# Patient Record
Sex: FEMALE | Race: OTHER | HISPANIC OR LATINO
[De-identification: names, ages, dates, MRNs, and addresses within clinical notes are randomized per-mention and may not be internally consistent; named-entity substitution may affect disease eponyms.]

---

## 2018-03-07 PROBLEM — Z00.00 ENCOUNTER FOR PREVENTIVE HEALTH EXAMINATION: Status: ACTIVE | Noted: 2018-03-07

## 2018-04-06 ENCOUNTER — APPOINTMENT (OUTPATIENT)
Dept: ENDOCRINOLOGY | Facility: CLINIC | Age: 68
End: 2018-04-06
Payer: MEDICAID

## 2018-04-06 VITALS
WEIGHT: 126 LBS | HEART RATE: 66 BPM | HEIGHT: 63 IN | DIASTOLIC BLOOD PRESSURE: 80 MMHG | OXYGEN SATURATION: 96 % | BODY MASS INDEX: 22.32 KG/M2 | RESPIRATION RATE: 16 BRPM | SYSTOLIC BLOOD PRESSURE: 120 MMHG

## 2018-04-06 DIAGNOSIS — I10 ESSENTIAL (PRIMARY) HYPERTENSION: ICD-10-CM

## 2018-04-06 DIAGNOSIS — Z78.9 OTHER SPECIFIED HEALTH STATUS: ICD-10-CM

## 2018-04-06 DIAGNOSIS — E04.1 NONTOXIC SINGLE THYROID NODULE: ICD-10-CM

## 2018-04-06 DIAGNOSIS — E05.90 THYROTOXICOSIS, UNSPECIFIED W/OUT THYROTOXIC CRISIS OR STORM: ICD-10-CM

## 2018-04-06 PROCEDURE — 99204 OFFICE O/P NEW MOD 45 MIN: CPT

## 2018-04-06 RX ORDER — ATORVASTATIN CALCIUM 40 MG/1
40 TABLET, FILM COATED ORAL
Qty: 30 | Refills: 0 | Status: ACTIVE | COMMUNITY
Start: 2017-07-24

## 2018-04-06 RX ORDER — LOSARTAN POTASSIUM AND HYDROCHLOROTHIAZIDE 12.5; 5 MG/1; MG/1
50-12.5 TABLET ORAL
Qty: 30 | Refills: 0 | Status: ACTIVE | COMMUNITY
Start: 2017-02-15

## 2018-04-06 RX ORDER — ATORVASTATIN CALCIUM 20 MG/1
20 TABLET, FILM COATED ORAL
Refills: 0 | Status: ACTIVE | COMMUNITY

## 2018-04-06 RX ORDER — GABAPENTIN 100 MG/1
100 CAPSULE ORAL
Qty: 30 | Refills: 0 | Status: ACTIVE | COMMUNITY
Start: 2017-08-30

## 2018-04-06 RX ORDER — BUSPIRONE HYDROCHLORIDE 5 MG/1
5 TABLET ORAL
Qty: 30 | Refills: 0 | Status: ACTIVE | COMMUNITY
Start: 2018-01-02

## 2018-04-06 RX ORDER — MELOXICAM 7.5 MG/1
7.5 TABLET ORAL
Qty: 30 | Refills: 0 | Status: ACTIVE | COMMUNITY
Start: 2018-03-13

## 2018-04-06 RX ORDER — LOSARTAN POTASSIUM 50 MG/1
50 TABLET, FILM COATED ORAL
Refills: 0 | Status: ACTIVE | COMMUNITY

## 2018-04-06 RX ORDER — ATORVASTATIN CALCIUM 20 MG/1
20 TABLET, FILM COATED ORAL
Qty: 30 | Refills: 0 | Status: ACTIVE | COMMUNITY
Start: 2018-02-17

## 2018-04-06 RX ORDER — BACLOFEN 10 MG/1
10 TABLET ORAL
Qty: 30 | Refills: 0 | Status: ACTIVE | COMMUNITY
Start: 2017-08-30

## 2023-03-28 ENCOUNTER — FORM ENCOUNTER (OUTPATIENT)
Age: 73
End: 2023-03-28

## 2023-04-04 ENCOUNTER — EMERGENCY (EMERGENCY)
Facility: HOSPITAL | Age: 73
LOS: 1 days | Discharge: HOME | End: 2023-04-04
Attending: EMERGENCY MEDICINE
Payer: MEDICARE

## 2023-04-04 VITALS
HEART RATE: 66 BPM | TEMPERATURE: 97 F | SYSTOLIC BLOOD PRESSURE: 158 MMHG | OXYGEN SATURATION: 99 % | RESPIRATION RATE: 18 BRPM | HEIGHT: 62 IN | DIASTOLIC BLOOD PRESSURE: 77 MMHG | WEIGHT: 132.28 LBS

## 2023-04-04 LAB
ALBUMIN SERPL ELPH-MCNC: 3.7 G/DL — SIGNIFICANT CHANGE UP (ref 3.5–5)
ALP SERPL-CCNC: 142 U/L — HIGH (ref 40–120)
ALT FLD-CCNC: 25 U/L DA — SIGNIFICANT CHANGE UP (ref 10–60)
ANION GAP SERPL CALC-SCNC: 1 MMOL/L — LOW (ref 5–17)
APPEARANCE UR: CLEAR — SIGNIFICANT CHANGE UP
AST SERPL-CCNC: 24 U/L — SIGNIFICANT CHANGE UP (ref 10–40)
BASOPHILS # BLD AUTO: 0.03 K/UL — SIGNIFICANT CHANGE UP (ref 0–0.2)
BASOPHILS NFR BLD AUTO: 0.4 % — SIGNIFICANT CHANGE UP (ref 0–2)
BILIRUB SERPL-MCNC: 0.2 MG/DL — SIGNIFICANT CHANGE UP (ref 0.2–1.2)
BILIRUB UR-MCNC: NEGATIVE — SIGNIFICANT CHANGE UP
BUN SERPL-MCNC: 30 MG/DL — HIGH (ref 7–18)
CALCIUM SERPL-MCNC: 9.1 MG/DL — SIGNIFICANT CHANGE UP (ref 8.4–10.5)
CHLORIDE SERPL-SCNC: 109 MMOL/L — HIGH (ref 96–108)
CO2 SERPL-SCNC: 28 MMOL/L — SIGNIFICANT CHANGE UP (ref 22–31)
COLOR SPEC: YELLOW — SIGNIFICANT CHANGE UP
CREAT SERPL-MCNC: 1.27 MG/DL — SIGNIFICANT CHANGE UP (ref 0.5–1.3)
D DIMER BLD IA.RAPID-MCNC: 315 NG/ML DDU — HIGH
DIFF PNL FLD: NEGATIVE — SIGNIFICANT CHANGE UP
EGFR: 45 ML/MIN/1.73M2 — LOW
EOSINOPHIL # BLD AUTO: 0.13 K/UL — SIGNIFICANT CHANGE UP (ref 0–0.5)
EOSINOPHIL NFR BLD AUTO: 1.8 % — SIGNIFICANT CHANGE UP (ref 0–6)
FLUAV AG NPH QL: SIGNIFICANT CHANGE UP
FLUBV AG NPH QL: SIGNIFICANT CHANGE UP
GLUCOSE SERPL-MCNC: 119 MG/DL — HIGH (ref 70–99)
GLUCOSE UR QL: NEGATIVE — SIGNIFICANT CHANGE UP
HCT VFR BLD CALC: 34.8 % — SIGNIFICANT CHANGE UP (ref 34.5–45)
HGB BLD-MCNC: 11.4 G/DL — LOW (ref 11.5–15.5)
IMM GRANULOCYTES NFR BLD AUTO: 0.3 % — SIGNIFICANT CHANGE UP (ref 0–0.9)
KETONES UR-MCNC: NEGATIVE — SIGNIFICANT CHANGE UP
LEUKOCYTE ESTERASE UR-ACNC: ABNORMAL
LYMPHOCYTES # BLD AUTO: 1.28 K/UL — SIGNIFICANT CHANGE UP (ref 1–3.3)
LYMPHOCYTES # BLD AUTO: 17.8 % — SIGNIFICANT CHANGE UP (ref 13–44)
MCHC RBC-ENTMCNC: 29 PG — SIGNIFICANT CHANGE UP (ref 27–34)
MCHC RBC-ENTMCNC: 32.8 GM/DL — SIGNIFICANT CHANGE UP (ref 32–36)
MCV RBC AUTO: 88.5 FL — SIGNIFICANT CHANGE UP (ref 80–100)
MONOCYTES # BLD AUTO: 0.41 K/UL — SIGNIFICANT CHANGE UP (ref 0–0.9)
MONOCYTES NFR BLD AUTO: 5.7 % — SIGNIFICANT CHANGE UP (ref 2–14)
NEUTROPHILS # BLD AUTO: 5.31 K/UL — SIGNIFICANT CHANGE UP (ref 1.8–7.4)
NEUTROPHILS NFR BLD AUTO: 74 % — SIGNIFICANT CHANGE UP (ref 43–77)
NITRITE UR-MCNC: NEGATIVE — SIGNIFICANT CHANGE UP
NRBC # BLD: 0 /100 WBCS — SIGNIFICANT CHANGE UP (ref 0–0)
NT-PROBNP SERPL-SCNC: 75 PG/ML — SIGNIFICANT CHANGE UP (ref 0–125)
PH UR: 7 — SIGNIFICANT CHANGE UP (ref 5–8)
PLATELET # BLD AUTO: 238 K/UL — SIGNIFICANT CHANGE UP (ref 150–400)
POTASSIUM SERPL-MCNC: 4.4 MMOL/L — SIGNIFICANT CHANGE UP (ref 3.5–5.3)
POTASSIUM SERPL-SCNC: 4.4 MMOL/L — SIGNIFICANT CHANGE UP (ref 3.5–5.3)
PROT SERPL-MCNC: 7 G/DL — SIGNIFICANT CHANGE UP (ref 6–8.3)
PROT UR-MCNC: NEGATIVE — SIGNIFICANT CHANGE UP
RBC # BLD: 3.93 M/UL — SIGNIFICANT CHANGE UP (ref 3.8–5.2)
RBC # FLD: 13.3 % — SIGNIFICANT CHANGE UP (ref 10.3–14.5)
SARS-COV-2 RNA SPEC QL NAA+PROBE: SIGNIFICANT CHANGE UP
SODIUM SERPL-SCNC: 138 MMOL/L — SIGNIFICANT CHANGE UP (ref 135–145)
SP GR SPEC: 1.01 — SIGNIFICANT CHANGE UP (ref 1.01–1.02)
TROPONIN I, HIGH SENSITIVITY RESULT: 6.6 NG/L — SIGNIFICANT CHANGE UP
UROBILINOGEN FLD QL: NEGATIVE — SIGNIFICANT CHANGE UP
WBC # BLD: 7.18 K/UL — SIGNIFICANT CHANGE UP (ref 3.8–10.5)
WBC # FLD AUTO: 7.18 K/UL — SIGNIFICANT CHANGE UP (ref 3.8–10.5)

## 2023-04-04 PROCEDURE — 72131 CT LUMBAR SPINE W/O DYE: CPT | Mod: 26,MA

## 2023-04-04 PROCEDURE — 99285 EMERGENCY DEPT VISIT HI MDM: CPT

## 2023-04-04 NOTE — ED PROVIDER NOTE - NSFOLLOWUPINSTRUCTIONS_ED_ALL_ED_FT
Take tylenol 650mg or ibuprofen 600mg every 6 hours for pain.  Followup with PMD for reevaluation.  Return to ED if you develop worsening symptoms-high fevers, vomiting or difficulty breathing.    [place] tylenol 650 mg o ibuprofeno 600 mg cada 6 horas para el dolor.  Seguimiento con PMD para reevaluación.  Regrese a la ronnie de urgencias si presenta síntomas que empeoran: fiebre jose, vómitos o dificultad para respirar.

## 2023-04-04 NOTE — ED PROVIDER NOTE - CLINICAL SUMMARY MEDICAL DECISION MAKING FREE TEXT BOX
73-year-old female with history of hypertension, hyperlipidemia, GERD, depression/anxiety, dementia presents with 2 days of shortness of breath and low back pain. Will obtain EKG labs chest x-ray CT L-spine.  Given Toradol for low back pain.  Will reassess. 73-year-old female with history of hypertension, hyperlipidemia, GERD, depression/anxiety, dementia presents with 2 days of shortness of breath and low back pain. Will obtain EKG labs chest x-ray CT L-spine.  Given Toradol for low back pain.  Will reassess.  ekg interpretation- no acute ischemic changes  lab interpretation- trop neg, ddimer 315  imaging interpretation- CXR shows no focal infiltrate  CTA chest neg for PE  CTLspine shows no fracture  Discussed above with patient/family. On reeval patient reports mild headache, given tylenol.  patient stable for discharge with careful return to ED precautions.

## 2023-04-04 NOTE — ED PROVIDER NOTE - NSCAREINITIATED _GEN_ER
Follow-up in 3 months  Follow diet as discussed  Get lab work done prior to next office visit  It is recommended to check with your insurance BEFORE getting labs done to make sure they are covered by your policy  Make sure to HOLD any multivitamins that may contain biotin and any biotin supplements FOR 5 DAYS before any labs since it can affect the results  Follow vitamin  and mineral recommendations as reviewed with you  Exercise as tolerated  Call our office if you have any problems with abdominal pain especially associated with fever, chills, nausea, vomiting or any other concerns  All  Post-bariatric surgery patients should be aware that very small quantities of any alcohol  can cause impairment and it is very possible not to feel the effect  The effect can be in the system for several hours  It is also a stomach irritant  It is advised to AVOID alcohol, Nonsteroidal antiinflammatory drugs (NSAIDS) and nicotine of all forms   Any of these can cause stomach irritation/pain 
Phu Mata(Attending)

## 2023-04-04 NOTE — ED PROVIDER NOTE - OBJECTIVE STATEMENT
73-year-old female with history of hypertension, hyperlipidemia, GERD, depression/anxiety, dementia presents with 2 days of shortness of breath and low back pain.  Denies any fevers cough or chest pain.  Reports low back pain its in the middle of her low back and yesterday was radiating to her right buttock area/posterior thigh.  Reports that today the buttock and thigh pain have resolved.  Reports taking Tylenol for pain which helped with her back pain.  Denies any past history of shortness of breath.  Denies any recent leg swelling or recent travel.

## 2023-04-04 NOTE — ED PROVIDER NOTE - MUSCULOSKELETAL MINIMAL EXAM
mild ttp over midline/bilateral paraspinal L3-L5 areas,  BLE- normal sensory function/ normal strength/atraumatic/normal range of motion

## 2023-04-04 NOTE — ED PROVIDER NOTE - PATIENT PORTAL LINK FT
You can access the FollowMyHealth Patient Portal offered by [facility] by registering at the following website: [email or web address]. By joining [facility]’s FollowMyHealth portal, you will also be able to view your health information using other applications (apps) compatible with our system.

## 2023-04-05 VITALS
DIASTOLIC BLOOD PRESSURE: 81 MMHG | RESPIRATION RATE: 18 BRPM | TEMPERATURE: 98 F | OXYGEN SATURATION: 98 % | SYSTOLIC BLOOD PRESSURE: 176 MMHG | HEART RATE: 73 BPM

## 2023-04-05 PROCEDURE — 85025 COMPLETE CBC W/AUTO DIFF WBC: CPT

## 2023-04-05 PROCEDURE — 36415 COLL VENOUS BLD VENIPUNCTURE: CPT

## 2023-04-05 PROCEDURE — 71275 CT ANGIOGRAPHY CHEST: CPT | Mod: MA

## 2023-04-05 PROCEDURE — 72131 CT LUMBAR SPINE W/O DYE: CPT | Mod: MA

## 2023-04-05 PROCEDURE — 81001 URINALYSIS AUTO W/SCOPE: CPT

## 2023-04-05 PROCEDURE — 87086 URINE CULTURE/COLONY COUNT: CPT

## 2023-04-05 PROCEDURE — 87637 SARSCOV2&INF A&B&RSV AMP PRB: CPT

## 2023-04-05 PROCEDURE — 71275 CT ANGIOGRAPHY CHEST: CPT | Mod: 26,MA

## 2023-04-05 PROCEDURE — 84484 ASSAY OF TROPONIN QUANT: CPT

## 2023-04-05 PROCEDURE — 80053 COMPREHEN METABOLIC PANEL: CPT

## 2023-04-05 PROCEDURE — 71045 X-RAY EXAM CHEST 1 VIEW: CPT

## 2023-04-05 PROCEDURE — 85379 FIBRIN DEGRADATION QUANT: CPT

## 2023-04-05 PROCEDURE — 93005 ELECTROCARDIOGRAM TRACING: CPT

## 2023-04-05 PROCEDURE — 83880 ASSAY OF NATRIURETIC PEPTIDE: CPT

## 2023-04-05 PROCEDURE — 71045 X-RAY EXAM CHEST 1 VIEW: CPT | Mod: 26

## 2023-04-05 PROCEDURE — 99285 EMERGENCY DEPT VISIT HI MDM: CPT | Mod: 25

## 2023-04-05 RX ORDER — ACETAMINOPHEN 500 MG
650 TABLET ORAL ONCE
Refills: 0 | Status: COMPLETED | OUTPATIENT
Start: 2023-04-05 | End: 2023-04-05

## 2023-04-05 RX ADMIN — Medication 650 MILLIGRAM(S): at 04:06

## 2023-04-05 RX ADMIN — Medication 650 MILLIGRAM(S): at 04:35

## 2023-04-06 LAB
CULTURE RESULTS: SIGNIFICANT CHANGE UP
SPECIMEN SOURCE: SIGNIFICANT CHANGE UP

## 2023-04-26 ENCOUNTER — APPOINTMENT (OUTPATIENT)
Dept: SURGICAL ONCOLOGY | Facility: CLINIC | Age: 73
End: 2023-04-26
Payer: MEDICARE

## 2023-04-26 VITALS
TEMPERATURE: 96 F | BODY MASS INDEX: 22.86 KG/M2 | OXYGEN SATURATION: 100 % | WEIGHT: 129 LBS | HEART RATE: 53 BPM | HEIGHT: 63 IN | DIASTOLIC BLOOD PRESSURE: 65 MMHG | SYSTOLIC BLOOD PRESSURE: 103 MMHG

## 2023-04-26 LAB
ALBUMIN SERPL ELPH-MCNC: 4.7 G/DL
ALP BLD-CCNC: 143 U/L
ALT SERPL-CCNC: 19 U/L
ANION GAP SERPL CALC-SCNC: 13 MMOL/L
AST SERPL-CCNC: 26 U/L
BASOPHILS # BLD AUTO: 0.03 K/UL
BASOPHILS NFR BLD AUTO: 0.5 %
BILIRUB SERPL-MCNC: 0.3 MG/DL
BUN SERPL-MCNC: 21 MG/DL
CALCIUM SERPL-MCNC: 10 MG/DL
CEA SERPL-MCNC: 2.5 NG/ML
CHLORIDE SERPL-SCNC: 101 MMOL/L
CO2 SERPL-SCNC: 27 MMOL/L
CREAT SERPL-MCNC: 0.94 MG/DL
EGFR: 65 ML/MIN/1.73M2
EOSINOPHIL # BLD AUTO: 0.12 K/UL
EOSINOPHIL NFR BLD AUTO: 2 %
GLUCOSE SERPL-MCNC: 84 MG/DL
HCT VFR BLD CALC: 38.4 %
HGB BLD-MCNC: 12.7 G/DL
IMM GRANULOCYTES NFR BLD AUTO: 0.5 %
LYMPHOCYTES # BLD AUTO: 1.13 K/UL
LYMPHOCYTES NFR BLD AUTO: 18.4 %
MAN DIFF?: NORMAL
MCHC RBC-ENTMCNC: 29.1 PG
MCHC RBC-ENTMCNC: 33.1 GM/DL
MCV RBC AUTO: 87.9 FL
MONOCYTES # BLD AUTO: 0.49 K/UL
MONOCYTES NFR BLD AUTO: 8 %
NEUTROPHILS # BLD AUTO: 4.33 K/UL
NEUTROPHILS NFR BLD AUTO: 70.6 %
PLATELET # BLD AUTO: 243 K/UL
POTASSIUM SERPL-SCNC: 4.5 MMOL/L
PROT SERPL-MCNC: 7.4 G/DL
RBC # BLD: 4.37 M/UL
RBC # FLD: 13.4 %
SODIUM SERPL-SCNC: 141 MMOL/L
WBC # FLD AUTO: 6.13 K/UL

## 2023-04-26 PROCEDURE — 99204 OFFICE O/P NEW MOD 45 MIN: CPT

## 2023-05-02 NOTE — ASSESSMENT
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 Both location tattooed. T1b\par \par \par \par PLAN: \par CT chest/abd/pelvis\par CBC, CMP, CEA now \par Medical optimization from Dr. Reyes \par RTO 1 week after imaging \par Will plan for robotic distal gastrectomy tentatively pending CT and labs.\par \par I have discussed the risks, benefits, alternatives, complications including but not limited bleeding, infection, damage to adjacent structures, sepsis, need for further procedures, sepsis, tumor recurrence to the patient in detail. Patient expressed verbal understanding. Written informed consent to be obtained in the preoperative period \par \par I have discussed the diagnosis, therapeutic plan and options with the patient at length. Patient expressed verbal understanding to proceed with proposed plan. All questions answered. \par  \par \par

## 2023-05-02 NOTE — HISTORY OF PRESENT ILLNESS
[de-identified] : Ms. GABY BOYKIN is a 73 year old female who present today for initial consultation gastric cancer. Referred Dr. 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 \par EUS 4/26/23: 1 cm raised mid body anterior wall of stomach and 1.2 cm gastric polypoid lesion in the posterior wall. Both location tattooed. T1 \par \par Gaby denies abdominal pain, bloating, nausea/vomiting, bowel habit changes, hematochezia, black sticky stools, fever, chills, night sweats or weight loss. \par \par PCP: Dr. Reyes \par

## 2023-05-02 NOTE — PHYSICAL EXAM
[Normal] : supple, no neck mass and thyroid not enlarged [Normal Neck Lymph Nodes] : normal neck lymph nodes  [Normal Supraclavicular Lymph Nodes] : normal supraclavicular lymph nodes [Normal Groin Lymph Nodes] : normal groin lymph nodes [Normal Axillary Lymph Nodes] : normal axillary lymph nodes [Normal] : oriented to person, place and time, with appropriate affect [FreeTextEntry1] : COVID-19 precautions as per [facility] policy was universally followed\par  [de-identified] : Abdomen soft, non-tender, non-distended, and no palpable masses.

## 2023-05-02 NOTE — CONSULT LETTER
[Consult Letter:] : I had the pleasure of evaluating your patient, [unfilled]. [( Thank you for referring [unfilled] for consultation for _____ )] : Thank you for referring [unfilled] for consultation for [unfilled] [Please see my note below.] : Please see my note below. [Consult Closing:] : Thank you very much for allowing me to participate in the care of this patient.  If you have any questions, please do not hesitate to contact me. [Sincerely,] : Sincerely, [FreeTextEntry2] : Dr. Palomino  [FreeTextEntry3] : Oswaldo Matthews MD, FICS, FACS\par Director of Surgical Oncology- [facility] \par , Department of Surgery  \par [facility] [employer] [facility] [employer] [facility] \par [address]\[place] [address]\par \par [address]\[place] [address]\par \par [address]\[place] [address]\par \par (mob) [phone]\par (o) [phone]\par (f) [phone]\par

## 2023-05-03 ENCOUNTER — APPOINTMENT (OUTPATIENT)
Dept: SURGICAL ONCOLOGY | Facility: CLINIC | Age: 73
End: 2023-05-03

## 2023-05-04 ENCOUNTER — RESULT REVIEW (OUTPATIENT)
Age: 73
End: 2023-05-04

## 2023-05-08 ENCOUNTER — FORM ENCOUNTER (OUTPATIENT)
Age: 73
End: 2023-05-08

## 2023-05-09 ENCOUNTER — APPOINTMENT (OUTPATIENT)
Dept: CT IMAGING | Facility: IMAGING CENTER | Age: 73
End: 2023-05-09
Payer: MEDICARE

## 2023-05-09 ENCOUNTER — OUTPATIENT (OUTPATIENT)
Dept: OUTPATIENT SERVICES | Facility: HOSPITAL | Age: 73
LOS: 1 days | End: 2023-05-09
Payer: MEDICARE

## 2023-05-09 DIAGNOSIS — C16.9 MALIGNANT NEOPLASM OF STOMACH, UNSPECIFIED: ICD-10-CM

## 2023-05-09 PROCEDURE — 74177 CT ABD & PELVIS W/CONTRAST: CPT | Mod: 26

## 2023-05-09 PROCEDURE — 74177 CT ABD & PELVIS W/CONTRAST: CPT

## 2023-05-10 ENCOUNTER — OUTPATIENT (OUTPATIENT)
Dept: OUTPATIENT SERVICES | Facility: HOSPITAL | Age: 73
LOS: 1 days | End: 2023-05-10
Payer: MEDICARE

## 2023-05-10 DIAGNOSIS — K31.7 POLYP OF STOMACH AND DUODENUM: ICD-10-CM

## 2023-05-10 DIAGNOSIS — C16.9 MALIGNANT NEOPLASM OF STOMACH, UNSPECIFIED: ICD-10-CM

## 2023-05-10 DIAGNOSIS — K31.89 OTHER DISEASES OF STOMACH AND DUODENUM: ICD-10-CM

## 2023-05-10 DIAGNOSIS — R10.13 EPIGASTRIC PAIN: ICD-10-CM

## 2023-05-10 DIAGNOSIS — K21.9 GASTRO-ESOPHAGEAL REFLUX DISEASE WITHOUT ESOPHAGITIS: ICD-10-CM

## 2023-05-11 LAB — SURGICAL PATHOLOGY STUDY: SIGNIFICANT CHANGE UP

## 2023-05-11 PROCEDURE — 88321 CONSLTJ&REPRT SLD PREP ELSWR: CPT

## 2023-05-12 ENCOUNTER — OUTPATIENT (OUTPATIENT)
Dept: OUTPATIENT SERVICES | Facility: HOSPITAL | Age: 73
LOS: 1 days | End: 2023-05-12

## 2023-05-12 VITALS
HEIGHT: 63 IN | OXYGEN SATURATION: 99 % | TEMPERATURE: 98 F | WEIGHT: 128.97 LBS | HEART RATE: 64 BPM | DIASTOLIC BLOOD PRESSURE: 76 MMHG | SYSTOLIC BLOOD PRESSURE: 157 MMHG | RESPIRATION RATE: 18 BRPM

## 2023-05-12 DIAGNOSIS — C16.9 MALIGNANT NEOPLASM OF STOMACH, UNSPECIFIED: ICD-10-CM

## 2023-05-12 DIAGNOSIS — F41.9 ANXIETY DISORDER, UNSPECIFIED: ICD-10-CM

## 2023-05-12 DIAGNOSIS — E78.5 HYPERLIPIDEMIA, UNSPECIFIED: ICD-10-CM

## 2023-05-12 DIAGNOSIS — Z01.818 ENCOUNTER FOR OTHER PREPROCEDURAL EXAMINATION: ICD-10-CM

## 2023-05-12 DIAGNOSIS — F03.90 UNSPECIFIED DEMENTIA, UNSPECIFIED SEVERITY, WITHOUT BEHAVIORAL DISTURBANCE, PSYCHOTIC DISTURBANCE, MOOD DISTURBANCE, AND ANXIETY: ICD-10-CM

## 2023-05-12 DIAGNOSIS — I10 ESSENTIAL (PRIMARY) HYPERTENSION: ICD-10-CM

## 2023-05-12 LAB — BLD GP AB SCN SERPL QL: SIGNIFICANT CHANGE UP

## 2023-05-12 RX ORDER — TELMISARTAN 20 MG/1
1 TABLET ORAL
Refills: 0 | DISCHARGE

## 2023-05-12 RX ORDER — MEMANTINE HYDROCHLORIDE 10 MG/1
1 TABLET ORAL
Refills: 0 | DISCHARGE

## 2023-05-12 RX ORDER — SERTRALINE 25 MG/1
1 TABLET, FILM COATED ORAL
Refills: 0 | DISCHARGE

## 2023-05-12 RX ORDER — ATORVASTATIN CALCIUM 80 MG/1
1 TABLET, FILM COATED ORAL
Refills: 0 | DISCHARGE

## 2023-05-12 RX ORDER — PANTOPRAZOLE SODIUM 20 MG/1
1 TABLET, DELAYED RELEASE ORAL
Refills: 0 | DISCHARGE

## 2023-05-12 RX ORDER — CLONAZEPAM 1 MG
1 TABLET ORAL
Refills: 0 | DISCHARGE

## 2023-05-12 RX ORDER — SUCRALFATE 1 G
1 TABLET ORAL
Refills: 0 | DISCHARGE

## 2023-05-12 NOTE — H&P PST ADULT - BIRTH SEX
Rest, drink plenty of fluids.  Clear liquid diet for 24 hours and advance as tolerated to bland diet until symptoms improve.  Take your meds as prescribed.  You may also take over-the-counter acetaminophen as needed for pain or fever.  I would consult my OB/GYN by telephone tomorrow to discuss your Zoloft and your Risperdal medications to see if they want you to continue those with your pregnancy center appointments not until August.  Follow-up with them as scheduled.  Follow-up with your primary healthcare provider as needed.  Return to the emergency department for any acutely developing abdominal/pelvic pain, any persistent vaginal bleeding greater than 1 saturated pad per hour, any persistent vomiting or any new or worse concerns.   Female

## 2023-05-12 NOTE — H&P PST ADULT - PROBLEM SELECTOR PLAN 1
Pt is scheduled for robotic assisted distal gastrectomy with esophagogastroduodenoscopy possible open on 5/18/2023.  DARRIN stop bang score 3. Pt denies history of DARRIN, never did sleep study.  As per pt's sister, pt will be optimized by PCP and cardiology.  Preoperative instructions discussed with pt's sister Lorin Jaiml via Indonesian speaking [employer]  florin Rivasjandra ID# [id number]. Czech Copy given to pt.   Instructed pt's sister that she will need someone to escort her home after surgery, to notify security when she arrives in the lobby of the hospital that she is here for surgery, not to eat or drink anything after midnight the night before the surgery, to avoid NSAIDs such as Ibuprofen, Motrin, Aleve, Advil, Naproxen before surgery, to take Tylenol if needed for pain, to report if she has been exposed to any one with any contagious diseases including Covid-19 or if she is exhibiting any symptoms of COVID-19.   Instructed pt's sister about use of Chlorhexidine 4% soap for 3 days before surgery including the morning of the surgery to prevent infection. Verbalized understanding of instructions given. Pt is scheduled for robotic assisted distal gastrectomy with esophagogastroduodenoscopy possible open on 5/18/2023.  DARRIN stop bang score 3. Pt denies history of DARRIN, never did sleep study.  As per pt's sister, pt will be optimized by PCP.  Preoperative instructions discussed with pt's sister Lorin Jamil via [employer] speaking [employer]  florin Rivasjandra ID# [id number]. Libyan Copy given to pt.   Instructed pt's sister that she will need someone to escort her home after surgery, to notify security when she arrives in the lobby of the hospital that she is here for surgery, not to eat or drink anything after midnight the night before the surgery, to avoid NSAIDs such as Ibuprofen, Motrin, Aleve, Advil, Naproxen before surgery, to take Tylenol if needed for pain, to report if she has been exposed to any one with any contagious diseases including Covid-19 or if she is exhibiting any symptoms of COVID-19.   Instructed pt's sister about use of Chlorhexidine 4% soap for 3 days before surgery including the morning of the surgery to prevent infection. Verbalized understanding of instructions given.

## 2023-05-12 NOTE — H&P PST ADULT - NSICDXPASTMEDICALHX_GEN_ALL_CORE_FT
PAST MEDICAL HISTORY:  Hypertension     Malignant neoplasm of stomach      PAST MEDICAL HISTORY:  Anxiety and depression     Dementia     HLD (hyperlipidemia)     Hypertension     Malignant neoplasm of stomach

## 2023-05-12 NOTE — H&P PST ADULT - HISTORY OF PRESENT ILLNESS
This is a 73 year old  female with PMH of HTN, HLD, anxiety and depression, dementia for years who presents with c/o gastric cancer. As per pt's sister, pt underwent EGD that revealed gastric polyps. Pathology revealed cancerous cells. Pt is scheduled for robotic assisted distal gastrectomy with esophagoduodenoscopy possible open on 5/18/2023.

## 2023-05-12 NOTE — H&P PST ADULT - PROBLEM SELECTOR PLAN 2
Instructed pt's sister to continue antihypertensive med-Telmisartan and take it with sips of water on day of surgery.    Follow-up with PCP postop for management.

## 2023-05-12 NOTE — H&P PST ADULT - PROBLEM SELECTOR PLAN 5
Instructed to continue memantine and take it with sips of water on day of surgery.  As per pt's sister, she will sign consent for surgery.  Follow-up with provider for management.

## 2023-05-12 NOTE — H&P PST ADULT - NSICDXPROCEDURE_GEN_ALL_CORE_FT
PROCEDURES:  Distal partial gastrectomy 12-May-2023 14:54:45  Comfort Meraz  EGD 12-May-2023 14:55:03  Comfort Meraz

## 2023-05-12 NOTE — H&P PST ADULT - ASSESSMENT
This is a 73 year old  female with PMH of HTN, HLD, anxiety and depression, dementia for years who presents with gastric cancer. Pt is scheduled for robotic assisted distal gastrectomy with esophagogastroduodenoscopy possible open on 5/18/2023.  DARRIN stop bang score 3. Pt denies history of DARRIN, never did sleep study.

## 2023-05-12 NOTE — H&P PST ADULT - PROBLEM SELECTOR PLAN 4
Instructed pt to continue meds, to take them with sips of water on day of surgery and to  follow-up with PCP/provider postop for management

## 2023-05-13 LAB
A1C WITH ESTIMATED AVERAGE GLUCOSE RESULT: 5.7 % — HIGH (ref 4–5.6)
ESTIMATED AVERAGE GLUCOSE: 117 MG/DL — HIGH (ref 68–114)

## 2023-05-15 ENCOUNTER — FORM ENCOUNTER (OUTPATIENT)
Age: 73
End: 2023-05-15

## 2023-05-16 ENCOUNTER — APPOINTMENT (OUTPATIENT)
Dept: NUCLEAR MEDICINE | Facility: IMAGING CENTER | Age: 73
End: 2023-05-16
Payer: MEDICARE

## 2023-05-16 ENCOUNTER — APPOINTMENT (OUTPATIENT)
Dept: NUCLEAR MEDICINE | Facility: IMAGING CENTER | Age: 73
End: 2023-05-16

## 2023-05-16 ENCOUNTER — OUTPATIENT (OUTPATIENT)
Dept: OUTPATIENT SERVICES | Facility: HOSPITAL | Age: 73
LOS: 1 days | End: 2023-05-16
Payer: MEDICARE

## 2023-05-16 DIAGNOSIS — C16.9 MALIGNANT NEOPLASM OF STOMACH, UNSPECIFIED: ICD-10-CM

## 2023-05-16 PROBLEM — F03.90 UNSPECIFIED DEMENTIA, UNSPECIFIED SEVERITY, WITHOUT BEHAVIORAL DISTURBANCE, PSYCHOTIC DISTURBANCE, MOOD DISTURBANCE, AND ANXIETY: Chronic | Status: ACTIVE | Noted: 2023-05-12

## 2023-05-16 PROBLEM — F41.9 ANXIETY DISORDER, UNSPECIFIED: Chronic | Status: ACTIVE | Noted: 2023-05-12

## 2023-05-16 PROBLEM — E78.5 HYPERLIPIDEMIA, UNSPECIFIED: Chronic | Status: ACTIVE | Noted: 2023-05-12

## 2023-05-16 PROCEDURE — 78815 PET IMAGE W/CT SKULL-THIGH: CPT

## 2023-05-16 PROCEDURE — 78815 PET IMAGE W/CT SKULL-THIGH: CPT | Mod: 26,PI

## 2023-05-16 PROCEDURE — A9552: CPT

## 2023-05-17 ENCOUNTER — TRANSCRIPTION ENCOUNTER (OUTPATIENT)
Age: 73
End: 2023-05-17

## 2023-05-17 ENCOUNTER — FORM ENCOUNTER (OUTPATIENT)
Age: 73
End: 2023-05-17

## 2023-05-18 ENCOUNTER — APPOINTMENT (OUTPATIENT)
Dept: SURGICAL ONCOLOGY | Facility: HOSPITAL | Age: 73
End: 2023-05-18

## 2023-05-18 ENCOUNTER — INPATIENT (INPATIENT)
Facility: HOSPITAL | Age: 73
LOS: 3 days | Discharge: HOME HEALTH | DRG: 327 | End: 2023-05-22
Attending: SURGERY | Admitting: SURGERY
Payer: MEDICARE

## 2023-05-18 VITALS
SYSTOLIC BLOOD PRESSURE: 147 MMHG | HEART RATE: 54 BPM | HEIGHT: 63 IN | RESPIRATION RATE: 16 BRPM | DIASTOLIC BLOOD PRESSURE: 73 MMHG | WEIGHT: 128.97 LBS | OXYGEN SATURATION: 100 % | TEMPERATURE: 98 F

## 2023-05-18 DIAGNOSIS — C16.9 MALIGNANT NEOPLASM OF STOMACH, UNSPECIFIED: ICD-10-CM

## 2023-05-18 LAB
ALBUMIN SERPL ELPH-MCNC: 3.3 G/DL — LOW (ref 3.5–5)
ALP SERPL-CCNC: 110 U/L — SIGNIFICANT CHANGE UP (ref 40–120)
ALT FLD-CCNC: 230 U/L DA — HIGH (ref 10–60)
ANION GAP SERPL CALC-SCNC: 4 MMOL/L — LOW (ref 5–17)
AST SERPL-CCNC: 243 U/L — HIGH (ref 10–40)
BILIRUB SERPL-MCNC: 0.5 MG/DL — SIGNIFICANT CHANGE UP (ref 0.2–1.2)
BLD GP AB SCN SERPL QL: SIGNIFICANT CHANGE UP
BUN SERPL-MCNC: 21 MG/DL — HIGH (ref 7–18)
CALCIUM SERPL-MCNC: 8.8 MG/DL — SIGNIFICANT CHANGE UP (ref 8.4–10.5)
CHLORIDE SERPL-SCNC: 111 MMOL/L — HIGH (ref 96–108)
CO2 SERPL-SCNC: 25 MMOL/L — SIGNIFICANT CHANGE UP (ref 22–31)
CREAT SERPL-MCNC: 1.28 MG/DL — SIGNIFICANT CHANGE UP (ref 0.5–1.3)
EGFR: 45 ML/MIN/1.73M2 — LOW
GLUCOSE SERPL-MCNC: 125 MG/DL — HIGH (ref 70–99)
HCT VFR BLD CALC: 36.1 % — SIGNIFICANT CHANGE UP (ref 34.5–45)
HGB BLD-MCNC: 11.9 G/DL — SIGNIFICANT CHANGE UP (ref 11.5–15.5)
MAGNESIUM SERPL-MCNC: 2 MG/DL — SIGNIFICANT CHANGE UP (ref 1.6–2.6)
MCHC RBC-ENTMCNC: 28.8 PG — SIGNIFICANT CHANGE UP (ref 27–34)
MCHC RBC-ENTMCNC: 33 GM/DL — SIGNIFICANT CHANGE UP (ref 32–36)
MCV RBC AUTO: 87.4 FL — SIGNIFICANT CHANGE UP (ref 80–100)
NRBC # BLD: 0 /100 WBCS — SIGNIFICANT CHANGE UP (ref 0–0)
PHOSPHATE SERPL-MCNC: 4.5 MG/DL — SIGNIFICANT CHANGE UP (ref 2.5–4.5)
PLATELET # BLD AUTO: 201 K/UL — SIGNIFICANT CHANGE UP (ref 150–400)
POTASSIUM SERPL-MCNC: 4.1 MMOL/L — SIGNIFICANT CHANGE UP (ref 3.5–5.3)
POTASSIUM SERPL-SCNC: 4.1 MMOL/L — SIGNIFICANT CHANGE UP (ref 3.5–5.3)
PROT SERPL-MCNC: 6.4 G/DL — SIGNIFICANT CHANGE UP (ref 6–8.3)
RBC # BLD: 4.13 M/UL — SIGNIFICANT CHANGE UP (ref 3.8–5.2)
RBC # FLD: 13.1 % — SIGNIFICANT CHANGE UP (ref 10.3–14.5)
SODIUM SERPL-SCNC: 140 MMOL/L — SIGNIFICANT CHANGE UP (ref 135–145)
WBC # BLD: 11.69 K/UL — HIGH (ref 3.8–10.5)
WBC # FLD AUTO: 11.69 K/UL — HIGH (ref 3.8–10.5)

## 2023-05-18 PROCEDURE — 38570 LAPAROSCOPY LYMPH NODE BIOP: CPT

## 2023-05-18 PROCEDURE — 88108 CYTOPATH CONCENTRATE TECH: CPT | Mod: 26,59

## 2023-05-18 PROCEDURE — 88331 PATH CONSLTJ SURG 1 BLK 1SPC: CPT | Mod: 26

## 2023-05-18 PROCEDURE — 88307 TISSUE EXAM BY PATHOLOGIST: CPT | Mod: 26

## 2023-05-18 PROCEDURE — 88305 TISSUE EXAM BY PATHOLOGIST: CPT | Mod: 26

## 2023-05-18 PROCEDURE — 49905 OMENTAL FLAP INTRA-ABDOM: CPT

## 2023-05-18 PROCEDURE — 49203: CPT

## 2023-05-18 PROCEDURE — 88309 TISSUE EXAM BY PATHOLOGIST: CPT | Mod: 26

## 2023-05-18 PROCEDURE — 88189 FLOWCYTOMETRY/READ 16 & >: CPT

## 2023-05-18 PROCEDURE — 43236 UPPR GI SCOPE W/SUBMUC INJ: CPT

## 2023-05-18 PROCEDURE — 88334 PATH CONSLTJ SURG CYTO XM EA: CPT | Mod: 26,59

## 2023-05-18 PROCEDURE — S2900 ROBOTIC SURGICAL SYSTEM: CPT | Mod: NC

## 2023-05-18 PROCEDURE — 88341 IMHCHEM/IMCYTCHM EA ADD ANTB: CPT | Mod: 26

## 2023-05-18 PROCEDURE — 88342 IMHCHEM/IMCYTCHM 1ST ANTB: CPT | Mod: 26,59

## 2023-05-18 DEVICE — SURGICEL 4 X 8": Type: IMPLANTABLE DEVICE | Status: FUNCTIONAL

## 2023-05-18 DEVICE — LIGATING CLIPS WECK HEMOLOK POLYMER LARGE (PURPLE) 6: Type: IMPLANTABLE DEVICE | Status: FUNCTIONAL

## 2023-05-18 DEVICE — STAPLER COVIDIEN TRI-STAPLE 60MM PURPLE INTELLIGENT RELOAD: Type: IMPLANTABLE DEVICE | Status: FUNCTIONAL

## 2023-05-18 DEVICE — STAPLER COVIDIEN TRI-STAPLE 60MM PURPLE RELOAD: Type: IMPLANTABLE DEVICE | Status: FUNCTIONAL

## 2023-05-18 DEVICE — VISTASEAL FIBRIN HUMAN 10ML: Type: IMPLANTABLE DEVICE | Status: FUNCTIONAL

## 2023-05-18 RX ORDER — CEFOTETAN DISODIUM 1 G
1 VIAL (EA) INJECTION EVERY 12 HOURS
Refills: 0 | Status: COMPLETED | OUTPATIENT
Start: 2023-05-18 | End: 2023-05-19

## 2023-05-18 RX ORDER — SODIUM CHLORIDE 9 MG/ML
1000 INJECTION, SOLUTION INTRAVENOUS
Refills: 0 | Status: DISCONTINUED | OUTPATIENT
Start: 2023-05-18 | End: 2023-05-18

## 2023-05-18 RX ORDER — ONDANSETRON 8 MG/1
4 TABLET, FILM COATED ORAL ONCE
Refills: 0 | Status: DISCONTINUED | OUTPATIENT
Start: 2023-05-18 | End: 2023-05-18

## 2023-05-18 RX ORDER — ACETAMINOPHEN 500 MG
1000 TABLET ORAL ONCE
Refills: 0 | Status: COMPLETED | OUTPATIENT
Start: 2023-05-18 | End: 2023-05-19

## 2023-05-18 RX ORDER — HYDROMORPHONE HYDROCHLORIDE 2 MG/ML
0.5 INJECTION INTRAMUSCULAR; INTRAVENOUS; SUBCUTANEOUS
Refills: 0 | Status: DISCONTINUED | OUTPATIENT
Start: 2023-05-18 | End: 2023-05-20

## 2023-05-18 RX ORDER — SODIUM CHLORIDE 9 MG/ML
1000 INJECTION, SOLUTION INTRAVENOUS
Refills: 0 | Status: DISCONTINUED | OUTPATIENT
Start: 2023-05-18 | End: 2023-05-21

## 2023-05-18 RX ORDER — FENTANYL CITRATE 50 UG/ML
50 INJECTION INTRAVENOUS
Refills: 0 | Status: DISCONTINUED | OUTPATIENT
Start: 2023-05-18 | End: 2023-05-18

## 2023-05-18 RX ORDER — ONDANSETRON 8 MG/1
4 TABLET, FILM COATED ORAL EVERY 6 HOURS
Refills: 0 | Status: DISCONTINUED | OUTPATIENT
Start: 2023-05-18 | End: 2023-05-22

## 2023-05-18 RX ORDER — ACETAMINOPHEN 500 MG
1000 TABLET ORAL ONCE
Refills: 0 | Status: COMPLETED | OUTPATIENT
Start: 2023-05-19 | End: 2023-05-19

## 2023-05-18 RX ORDER — ENOXAPARIN SODIUM 100 MG/ML
40 INJECTION SUBCUTANEOUS EVERY 24 HOURS
Refills: 0 | Status: DISCONTINUED | OUTPATIENT
Start: 2023-05-19 | End: 2023-05-22

## 2023-05-18 RX ORDER — SODIUM CHLORIDE 9 MG/ML
3 INJECTION INTRAMUSCULAR; INTRAVENOUS; SUBCUTANEOUS EVERY 8 HOURS
Refills: 0 | Status: DISCONTINUED | OUTPATIENT
Start: 2023-05-18 | End: 2023-05-18

## 2023-05-18 RX ORDER — ACETAMINOPHEN 500 MG
1000 TABLET ORAL ONCE
Refills: 0 | Status: DISCONTINUED | OUTPATIENT
Start: 2023-05-18 | End: 2023-05-18

## 2023-05-18 RX ORDER — DIPHENHYDRAMINE HCL 50 MG
25 CAPSULE ORAL ONCE
Refills: 0 | Status: COMPLETED | OUTPATIENT
Start: 2023-05-18 | End: 2023-05-18

## 2023-05-18 RX ORDER — FENTANYL CITRATE 50 UG/ML
25 INJECTION INTRAVENOUS
Refills: 0 | Status: DISCONTINUED | OUTPATIENT
Start: 2023-05-18 | End: 2023-05-18

## 2023-05-18 RX ORDER — PANTOPRAZOLE SODIUM 20 MG/1
40 TABLET, DELAYED RELEASE ORAL DAILY
Refills: 0 | Status: DISCONTINUED | OUTPATIENT
Start: 2023-05-18 | End: 2023-05-21

## 2023-05-18 RX ORDER — ACETAMINOPHEN 500 MG
1000 TABLET ORAL ONCE
Refills: 0 | Status: COMPLETED | OUTPATIENT
Start: 2023-05-18 | End: 2023-05-18

## 2023-05-18 RX ORDER — CHLORHEXIDINE GLUCONATE 213 G/1000ML
1 SOLUTION TOPICAL ONCE
Refills: 0 | Status: COMPLETED | OUTPATIENT
Start: 2023-05-18 | End: 2023-05-18

## 2023-05-18 RX ADMIN — Medication 1000 MILLIGRAM(S): at 21:44

## 2023-05-18 RX ADMIN — Medication 25 MILLIGRAM(S): at 21:24

## 2023-05-18 RX ADMIN — Medication 1.25 MILLIGRAM(S): at 23:58

## 2023-05-18 RX ADMIN — Medication 1.25 MILLIGRAM(S): at 17:45

## 2023-05-18 RX ADMIN — SODIUM CHLORIDE 100 MILLILITER(S): 9 INJECTION, SOLUTION INTRAVENOUS at 21:24

## 2023-05-18 RX ADMIN — Medication 400 MILLIGRAM(S): at 21:24

## 2023-05-18 RX ADMIN — CHLORHEXIDINE GLUCONATE 1 APPLICATION(S): 213 SOLUTION TOPICAL at 07:16

## 2023-05-18 RX ADMIN — Medication 100 GRAM(S): at 17:45

## 2023-05-18 NOTE — CHART NOTE - NSCHARTNOTEFT_GEN_A_CORE
Pt POD 0 s/p distal partial gastrectomy, EGD  resting comfortably  no n/v    bang clear 600cc    Vital Signs Last 24 Hrs  T(C): 37.6 (18 May 2023 21:54), Max: 37.6 (18 May 2023 21:54)  T(F): 99.6 (18 May 2023 21:54), Max: 99.6 (18 May 2023 21:54)  HR: 80 (18 May 2023 21:54) (54 - 80)  BP: 124/47 (18 May 2023 21:54) (124/47 - 147/73)  BP(mean): 67 (18 May 2023 21:54) (67 - 90)  RR: 18 (18 May 2023 21:54) (12 - 18)  SpO2: 100% (18 May 2023 21:54) (97% - 100%)    Parameters below as of 18 May 2023 21:54  Patient On (Oxygen Delivery Method): room air    abd soft, inc/dsg CDI    stable post-op

## 2023-05-18 NOTE — PATIENT PROFILE ADULT - FALL HARM RISK - RISK INTERVENTIONS
Assistance OOB with selected safe patient handling equipment/Assistance with ambulation/Communicate Fall Risk and Risk Factors to all staff, patient, and family/Monitor gait and stability/Reinforce activity limits and safety measures with patient and family/Review medications for side effects contributing to fall risk/Sit up slowly, dangle for a short time, stand at bedside before walking/Toileting schedule using arm’s reach rule for commode and bathroom/Visual Cue: Yellow wristband/Bed in lowest position, wheels locked, appropriate side rails in place/Call bell, personal items and telephone in reach/Instruct patient to call for assistance before getting out of bed or chair/Non-slip footwear when patient is out of bed/[place] to call system/Physically safe environment - no spills, clutter or unnecessary equipment/Purposeful Proactive Rounding/Room/bathroom lighting operational, light cord in reach

## 2023-05-18 NOTE — BRIEF OPERATIVE NOTE - OPERATION/FINDINGS
EGD, distal gastrectomy with bilroth 2 reconstruction, biopsy of lymph node from jejunal mesentery sent for frozen

## 2023-05-19 LAB
ALBUMIN SERPL ELPH-MCNC: 2.8 G/DL — LOW (ref 3.5–5)
ALP SERPL-CCNC: 97 U/L — SIGNIFICANT CHANGE UP (ref 40–120)
ALT FLD-CCNC: 158 U/L DA — HIGH (ref 10–60)
ANION GAP SERPL CALC-SCNC: 3 MMOL/L — LOW (ref 5–17)
AST SERPL-CCNC: 136 U/L — HIGH (ref 10–40)
BILIRUB SERPL-MCNC: 0.6 MG/DL — SIGNIFICANT CHANGE UP (ref 0.2–1.2)
BUN SERPL-MCNC: 18 MG/DL — SIGNIFICANT CHANGE UP (ref 7–18)
CALCIUM SERPL-MCNC: 8.7 MG/DL — SIGNIFICANT CHANGE UP (ref 8.4–10.5)
CHLORIDE SERPL-SCNC: 110 MMOL/L — HIGH (ref 96–108)
CO2 SERPL-SCNC: 28 MMOL/L — SIGNIFICANT CHANGE UP (ref 22–31)
CREAT SERPL-MCNC: 1.09 MG/DL — SIGNIFICANT CHANGE UP (ref 0.5–1.3)
EGFR: 54 ML/MIN/1.73M2 — LOW
GLUCOSE SERPL-MCNC: 92 MG/DL — SIGNIFICANT CHANGE UP (ref 70–99)
HCT VFR BLD CALC: 31.4 % — LOW (ref 34.5–45)
HGB BLD-MCNC: 10.5 G/DL — LOW (ref 11.5–15.5)
MAGNESIUM SERPL-MCNC: 2.1 MG/DL — SIGNIFICANT CHANGE UP (ref 1.6–2.6)
MCHC RBC-ENTMCNC: 28.4 PG — SIGNIFICANT CHANGE UP (ref 27–34)
MCHC RBC-ENTMCNC: 33.4 GM/DL — SIGNIFICANT CHANGE UP (ref 32–36)
MCV RBC AUTO: 84.9 FL — SIGNIFICANT CHANGE UP (ref 80–100)
NRBC # BLD: 0 /100 WBCS — SIGNIFICANT CHANGE UP (ref 0–0)
PHOSPHATE SERPL-MCNC: 3.1 MG/DL — SIGNIFICANT CHANGE UP (ref 2.5–4.5)
PLATELET # BLD AUTO: 179 K/UL — SIGNIFICANT CHANGE UP (ref 150–400)
POTASSIUM SERPL-MCNC: 3.8 MMOL/L — SIGNIFICANT CHANGE UP (ref 3.5–5.3)
POTASSIUM SERPL-SCNC: 3.8 MMOL/L — SIGNIFICANT CHANGE UP (ref 3.5–5.3)
PROT SERPL-MCNC: 5.8 G/DL — LOW (ref 6–8.3)
RBC # BLD: 3.7 M/UL — LOW (ref 3.8–5.2)
RBC # FLD: 13.2 % — SIGNIFICANT CHANGE UP (ref 10.3–14.5)
SODIUM SERPL-SCNC: 141 MMOL/L — SIGNIFICANT CHANGE UP (ref 135–145)
WBC # BLD: 9.51 K/UL — SIGNIFICANT CHANGE UP (ref 3.8–10.5)
WBC # FLD AUTO: 9.51 K/UL — SIGNIFICANT CHANGE UP (ref 3.8–10.5)

## 2023-05-19 PROCEDURE — 74240 X-RAY XM UPR GI TRC 1CNTRST: CPT | Mod: 26

## 2023-05-19 RX ORDER — ACETAMINOPHEN 500 MG
1000 TABLET ORAL ONCE
Refills: 0 | Status: COMPLETED | OUTPATIENT
Start: 2023-05-19 | End: 2023-05-19

## 2023-05-19 RX ORDER — ACETAMINOPHEN 500 MG
1000 TABLET ORAL ONCE
Refills: 0 | Status: COMPLETED | OUTPATIENT
Start: 2023-05-20 | End: 2023-05-20

## 2023-05-19 RX ADMIN — Medication 1000 MILLIGRAM(S): at 06:34

## 2023-05-19 RX ADMIN — HYDROMORPHONE HYDROCHLORIDE 0.5 MILLIGRAM(S): 2 INJECTION INTRAMUSCULAR; INTRAVENOUS; SUBCUTANEOUS at 18:28

## 2023-05-19 RX ADMIN — Medication 1000 MILLIGRAM(S): at 13:48

## 2023-05-19 RX ADMIN — HYDROMORPHONE HYDROCHLORIDE 0.5 MILLIGRAM(S): 2 INJECTION INTRAMUSCULAR; INTRAVENOUS; SUBCUTANEOUS at 07:53

## 2023-05-19 RX ADMIN — Medication 400 MILLIGRAM(S): at 22:07

## 2023-05-19 RX ADMIN — Medication 400 MILLIGRAM(S): at 12:10

## 2023-05-19 RX ADMIN — HYDROMORPHONE HYDROCHLORIDE 0.5 MILLIGRAM(S): 2 INJECTION INTRAMUSCULAR; INTRAVENOUS; SUBCUTANEOUS at 08:39

## 2023-05-19 RX ADMIN — HYDROMORPHONE HYDROCHLORIDE 0.5 MILLIGRAM(S): 2 INJECTION INTRAMUSCULAR; INTRAVENOUS; SUBCUTANEOUS at 14:00

## 2023-05-19 RX ADMIN — ENOXAPARIN SODIUM 40 MILLIGRAM(S): 100 INJECTION SUBCUTANEOUS at 06:05

## 2023-05-19 RX ADMIN — HYDROMORPHONE HYDROCHLORIDE 0.5 MILLIGRAM(S): 2 INJECTION INTRAMUSCULAR; INTRAVENOUS; SUBCUTANEOUS at 15:00

## 2023-05-19 RX ADMIN — Medication 1.25 MILLIGRAM(S): at 12:13

## 2023-05-19 RX ADMIN — PANTOPRAZOLE SODIUM 40 MILLIGRAM(S): 20 TABLET, DELAYED RELEASE ORAL at 12:13

## 2023-05-19 RX ADMIN — Medication 1.25 MILLIGRAM(S): at 06:04

## 2023-05-19 RX ADMIN — Medication 400 MILLIGRAM(S): at 06:05

## 2023-05-19 RX ADMIN — Medication 1.25 MILLIGRAM(S): at 18:27

## 2023-05-19 RX ADMIN — Medication 100 GRAM(S): at 06:04

## 2023-05-20 LAB
ALBUMIN SERPL ELPH-MCNC: 3 G/DL — LOW (ref 3.5–5)
ALP SERPL-CCNC: 97 U/L — SIGNIFICANT CHANGE UP (ref 40–120)
ALT FLD-CCNC: 119 U/L DA — HIGH (ref 10–60)
ANION GAP SERPL CALC-SCNC: 7 MMOL/L — SIGNIFICANT CHANGE UP (ref 5–17)
AST SERPL-CCNC: 77 U/L — HIGH (ref 10–40)
BILIRUB SERPL-MCNC: 0.6 MG/DL — SIGNIFICANT CHANGE UP (ref 0.2–1.2)
BUN SERPL-MCNC: 16 MG/DL — SIGNIFICANT CHANGE UP (ref 7–18)
CALCIUM SERPL-MCNC: 9.1 MG/DL — SIGNIFICANT CHANGE UP (ref 8.4–10.5)
CHLORIDE SERPL-SCNC: 112 MMOL/L — HIGH (ref 96–108)
CO2 SERPL-SCNC: 25 MMOL/L — SIGNIFICANT CHANGE UP (ref 22–31)
CREAT SERPL-MCNC: 0.82 MG/DL — SIGNIFICANT CHANGE UP (ref 0.5–1.3)
EGFR: 76 ML/MIN/1.73M2 — SIGNIFICANT CHANGE UP
GLUCOSE SERPL-MCNC: 70 MG/DL — SIGNIFICANT CHANGE UP (ref 70–99)
HCT VFR BLD CALC: 32.2 % — LOW (ref 34.5–45)
HGB BLD-MCNC: 10.8 G/DL — LOW (ref 11.5–15.5)
MAGNESIUM SERPL-MCNC: 2 MG/DL — SIGNIFICANT CHANGE UP (ref 1.6–2.6)
MCHC RBC-ENTMCNC: 28.8 PG — SIGNIFICANT CHANGE UP (ref 27–34)
MCHC RBC-ENTMCNC: 33.5 GM/DL — SIGNIFICANT CHANGE UP (ref 32–36)
MCV RBC AUTO: 85.9 FL — SIGNIFICANT CHANGE UP (ref 80–100)
NRBC # BLD: 0 /100 WBCS — SIGNIFICANT CHANGE UP (ref 0–0)
PHOSPHATE SERPL-MCNC: 3.5 MG/DL — SIGNIFICANT CHANGE UP (ref 2.5–4.5)
PLATELET # BLD AUTO: 176 K/UL — SIGNIFICANT CHANGE UP (ref 150–400)
POTASSIUM SERPL-MCNC: 4.4 MMOL/L — SIGNIFICANT CHANGE UP (ref 3.5–5.3)
POTASSIUM SERPL-SCNC: 4.4 MMOL/L — SIGNIFICANT CHANGE UP (ref 3.5–5.3)
PROT SERPL-MCNC: 6.2 G/DL — SIGNIFICANT CHANGE UP (ref 6–8.3)
RBC # BLD: 3.75 M/UL — LOW (ref 3.8–5.2)
RBC # FLD: 13.2 % — SIGNIFICANT CHANGE UP (ref 10.3–14.5)
SODIUM SERPL-SCNC: 144 MMOL/L — SIGNIFICANT CHANGE UP (ref 135–145)
WBC # BLD: 10.19 K/UL — SIGNIFICANT CHANGE UP (ref 3.8–10.5)
WBC # FLD AUTO: 10.19 K/UL — SIGNIFICANT CHANGE UP (ref 3.8–10.5)

## 2023-05-20 PROCEDURE — 74018 RADEX ABDOMEN 1 VIEW: CPT | Mod: 26

## 2023-05-20 RX ORDER — HYDROMORPHONE HYDROCHLORIDE 2 MG/ML
0.5 INJECTION INTRAMUSCULAR; INTRAVENOUS; SUBCUTANEOUS EVERY 4 HOURS
Refills: 0 | Status: DISCONTINUED | OUTPATIENT
Start: 2023-05-20 | End: 2023-05-21

## 2023-05-20 RX ADMIN — Medication 1.25 MILLIGRAM(S): at 00:15

## 2023-05-20 RX ADMIN — Medication 1.25 MILLIGRAM(S): at 18:38

## 2023-05-20 RX ADMIN — HYDROMORPHONE HYDROCHLORIDE 0.5 MILLIGRAM(S): 2 INJECTION INTRAMUSCULAR; INTRAVENOUS; SUBCUTANEOUS at 19:32

## 2023-05-20 RX ADMIN — Medication 1.25 MILLIGRAM(S): at 13:36

## 2023-05-20 RX ADMIN — ENOXAPARIN SODIUM 40 MILLIGRAM(S): 100 INJECTION SUBCUTANEOUS at 06:37

## 2023-05-20 RX ADMIN — Medication 2 MILLIGRAM(S): at 23:28

## 2023-05-20 RX ADMIN — Medication 400 MILLIGRAM(S): at 03:21

## 2023-05-20 RX ADMIN — Medication 1000 MILLIGRAM(S): at 19:32

## 2023-05-20 RX ADMIN — SODIUM CHLORIDE 100 MILLILITER(S): 9 INJECTION, SOLUTION INTRAVENOUS at 03:21

## 2023-05-20 RX ADMIN — PANTOPRAZOLE SODIUM 40 MILLIGRAM(S): 20 TABLET, DELAYED RELEASE ORAL at 13:37

## 2023-05-20 RX ADMIN — HYDROMORPHONE HYDROCHLORIDE 0.5 MILLIGRAM(S): 2 INJECTION INTRAMUSCULAR; INTRAVENOUS; SUBCUTANEOUS at 22:13

## 2023-05-20 RX ADMIN — Medication 1.25 MILLIGRAM(S): at 06:37

## 2023-05-20 RX ADMIN — HYDROMORPHONE HYDROCHLORIDE 0.5 MILLIGRAM(S): 2 INJECTION INTRAMUSCULAR; INTRAVENOUS; SUBCUTANEOUS at 22:30

## 2023-05-21 LAB
ANION GAP SERPL CALC-SCNC: 4 MMOL/L — LOW (ref 5–17)
BASOPHILS # BLD AUTO: 0.03 K/UL — SIGNIFICANT CHANGE UP (ref 0–0.2)
BASOPHILS NFR BLD AUTO: 0.3 % — SIGNIFICANT CHANGE UP (ref 0–2)
BUN SERPL-MCNC: 12 MG/DL — SIGNIFICANT CHANGE UP (ref 7–18)
CALCIUM SERPL-MCNC: 8.8 MG/DL — SIGNIFICANT CHANGE UP (ref 8.4–10.5)
CHLORIDE SERPL-SCNC: 111 MMOL/L — HIGH (ref 96–108)
CO2 SERPL-SCNC: 25 MMOL/L — SIGNIFICANT CHANGE UP (ref 22–31)
CREAT SERPL-MCNC: 0.64 MG/DL — SIGNIFICANT CHANGE UP (ref 0.5–1.3)
EGFR: 94 ML/MIN/1.73M2 — SIGNIFICANT CHANGE UP
EOSINOPHIL # BLD AUTO: 0.11 K/UL — SIGNIFICANT CHANGE UP (ref 0–0.5)
EOSINOPHIL NFR BLD AUTO: 1.1 % — SIGNIFICANT CHANGE UP (ref 0–6)
GLUCOSE SERPL-MCNC: 70 MG/DL — SIGNIFICANT CHANGE UP (ref 70–99)
HCT VFR BLD CALC: 31.2 % — LOW (ref 34.5–45)
HGB BLD-MCNC: 10.6 G/DL — LOW (ref 11.5–15.5)
IMM GRANULOCYTES NFR BLD AUTO: 0.3 % — SIGNIFICANT CHANGE UP (ref 0–0.9)
LYMPHOCYTES # BLD AUTO: 0.71 K/UL — LOW (ref 1–3.3)
LYMPHOCYTES # BLD AUTO: 7.4 % — LOW (ref 13–44)
MCHC RBC-ENTMCNC: 29 PG — SIGNIFICANT CHANGE UP (ref 27–34)
MCHC RBC-ENTMCNC: 34 GM/DL — SIGNIFICANT CHANGE UP (ref 32–36)
MCV RBC AUTO: 85.2 FL — SIGNIFICANT CHANGE UP (ref 80–100)
MONOCYTES # BLD AUTO: 0.63 K/UL — SIGNIFICANT CHANGE UP (ref 0–0.9)
MONOCYTES NFR BLD AUTO: 6.5 % — SIGNIFICANT CHANGE UP (ref 2–14)
NEUTROPHILS # BLD AUTO: 8.13 K/UL — HIGH (ref 1.8–7.4)
NEUTROPHILS NFR BLD AUTO: 84.4 % — HIGH (ref 43–77)
NRBC # BLD: 0 /100 WBCS — SIGNIFICANT CHANGE UP (ref 0–0)
PLATELET # BLD AUTO: 200 K/UL — SIGNIFICANT CHANGE UP (ref 150–400)
POTASSIUM SERPL-MCNC: 4.1 MMOL/L — SIGNIFICANT CHANGE UP (ref 3.5–5.3)
POTASSIUM SERPL-SCNC: 4.1 MMOL/L — SIGNIFICANT CHANGE UP (ref 3.5–5.3)
RBC # BLD: 3.66 M/UL — LOW (ref 3.8–5.2)
RBC # FLD: 13.2 % — SIGNIFICANT CHANGE UP (ref 10.3–14.5)
SODIUM SERPL-SCNC: 140 MMOL/L — SIGNIFICANT CHANGE UP (ref 135–145)
WBC # BLD: 9.64 K/UL — SIGNIFICANT CHANGE UP (ref 3.8–10.5)
WBC # FLD AUTO: 9.64 K/UL — SIGNIFICANT CHANGE UP (ref 3.8–10.5)

## 2023-05-21 RX ORDER — ACETAMINOPHEN 500 MG
650 TABLET ORAL EVERY 6 HOURS
Refills: 0 | Status: DISCONTINUED | OUTPATIENT
Start: 2023-05-21 | End: 2023-05-22

## 2023-05-21 RX ORDER — MEMANTINE HYDROCHLORIDE 10 MG/1
10 TABLET ORAL
Refills: 0 | Status: DISCONTINUED | OUTPATIENT
Start: 2023-05-21 | End: 2023-05-22

## 2023-05-21 RX ORDER — PANTOPRAZOLE SODIUM 20 MG/1
40 TABLET, DELAYED RELEASE ORAL
Refills: 0 | Status: DISCONTINUED | OUTPATIENT
Start: 2023-05-21 | End: 2023-05-22

## 2023-05-21 RX ORDER — QUETIAPINE FUMARATE 200 MG/1
25 TABLET, FILM COATED ORAL AT BEDTIME
Refills: 0 | Status: DISCONTINUED | OUTPATIENT
Start: 2023-05-21 | End: 2023-05-22

## 2023-05-21 RX ORDER — ATORVASTATIN CALCIUM 80 MG/1
10 TABLET, FILM COATED ORAL AT BEDTIME
Refills: 0 | Status: DISCONTINUED | OUTPATIENT
Start: 2023-05-21 | End: 2023-05-22

## 2023-05-21 RX ORDER — SERTRALINE 25 MG/1
50 TABLET, FILM COATED ORAL DAILY
Refills: 0 | Status: DISCONTINUED | OUTPATIENT
Start: 2023-05-21 | End: 2023-05-22

## 2023-05-21 RX ORDER — ESCITALOPRAM OXALATE 10 MG/1
10 TABLET, FILM COATED ORAL DAILY
Refills: 0 | Status: DISCONTINUED | OUTPATIENT
Start: 2023-05-21 | End: 2023-05-22

## 2023-05-21 RX ORDER — LOSARTAN POTASSIUM 100 MG/1
50 TABLET, FILM COATED ORAL DAILY
Refills: 0 | Status: DISCONTINUED | OUTPATIENT
Start: 2023-05-21 | End: 2023-05-22

## 2023-05-21 RX ORDER — TRAMADOL HYDROCHLORIDE 50 MG/1
50 TABLET ORAL EVERY 6 HOURS
Refills: 0 | Status: DISCONTINUED | OUTPATIENT
Start: 2023-05-21 | End: 2023-05-22

## 2023-05-21 RX ADMIN — LOSARTAN POTASSIUM 50 MILLIGRAM(S): 100 TABLET, FILM COATED ORAL at 21:36

## 2023-05-21 RX ADMIN — ESCITALOPRAM OXALATE 10 MILLIGRAM(S): 10 TABLET, FILM COATED ORAL at 21:37

## 2023-05-21 RX ADMIN — Medication 1.25 MILLIGRAM(S): at 12:55

## 2023-05-21 RX ADMIN — ENOXAPARIN SODIUM 40 MILLIGRAM(S): 100 INJECTION SUBCUTANEOUS at 05:49

## 2023-05-21 RX ADMIN — SERTRALINE 50 MILLIGRAM(S): 25 TABLET, FILM COATED ORAL at 21:36

## 2023-05-21 RX ADMIN — ATORVASTATIN CALCIUM 10 MILLIGRAM(S): 80 TABLET, FILM COATED ORAL at 21:36

## 2023-05-21 RX ADMIN — TRAMADOL HYDROCHLORIDE 50 MILLIGRAM(S): 50 TABLET ORAL at 21:37

## 2023-05-21 RX ADMIN — TRAMADOL HYDROCHLORIDE 50 MILLIGRAM(S): 50 TABLET ORAL at 23:31

## 2023-05-21 RX ADMIN — MEMANTINE HYDROCHLORIDE 10 MILLIGRAM(S): 10 TABLET ORAL at 21:36

## 2023-05-21 RX ADMIN — PANTOPRAZOLE SODIUM 40 MILLIGRAM(S): 20 TABLET, DELAYED RELEASE ORAL at 12:55

## 2023-05-21 RX ADMIN — QUETIAPINE FUMARATE 25 MILLIGRAM(S): 200 TABLET, FILM COATED ORAL at 21:36

## 2023-05-21 RX ADMIN — Medication 1.25 MILLIGRAM(S): at 00:09

## 2023-05-21 RX ADMIN — PANTOPRAZOLE SODIUM 40 MILLIGRAM(S): 20 TABLET, DELAYED RELEASE ORAL at 21:36

## 2023-05-21 RX ADMIN — Medication 1.25 MILLIGRAM(S): at 05:49

## 2023-05-21 NOTE — PROGRESS NOTE ADULT - ASSESSMENT
73F s/p partial gastrectomy with B2 POD#3    - Clears, possible fulls  - OOB ambulate  - Pain control prn  - Sister at bedside ( from ) requesting psych consult because she's noticed that her mental status has significantly declined since the surgery, sometimes not recognizing herself, different than her baseline
73y.o. Female s/p partial gastrectomy with B2    -Rpt AXR this morning satisfactory  -Start CLD today  -OOB ambulate  -Pain control prn
A/P:   73F s/p Robotic assisted laparoscopic distal gastrectomy, D2 lymphadenectomy with billroth 2 reconstruction, biopsy of mesenteric lymph node. Recovering well so far.   -Pain control  -NPO, IVF  -monitor bowel function  -24 hours Abx- cefotetan  -Lovenox 40 QD, SCD  -restart home meds as able  -Plan for swallow study tonight vs tomorrow pending radiology weekend availability

## 2023-05-22 ENCOUNTER — TRANSCRIPTION ENCOUNTER (OUTPATIENT)
Age: 73
End: 2023-05-22

## 2023-05-22 VITALS
TEMPERATURE: 98 F | OXYGEN SATURATION: 98 % | DIASTOLIC BLOOD PRESSURE: 76 MMHG | HEART RATE: 64 BPM | RESPIRATION RATE: 18 BRPM | SYSTOLIC BLOOD PRESSURE: 121 MMHG

## 2023-05-22 LAB
ANION GAP SERPL CALC-SCNC: 7 MMOL/L — SIGNIFICANT CHANGE UP (ref 5–17)
BASOPHILS # BLD AUTO: 0.02 K/UL — SIGNIFICANT CHANGE UP (ref 0–0.2)
BASOPHILS NFR BLD AUTO: 0.3 % — SIGNIFICANT CHANGE UP (ref 0–2)
BUN SERPL-MCNC: 10 MG/DL — SIGNIFICANT CHANGE UP (ref 7–18)
CALCIUM SERPL-MCNC: 9.1 MG/DL — SIGNIFICANT CHANGE UP (ref 8.4–10.5)
CHLORIDE SERPL-SCNC: 108 MMOL/L — SIGNIFICANT CHANGE UP (ref 96–108)
CO2 SERPL-SCNC: 24 MMOL/L — SIGNIFICANT CHANGE UP (ref 22–31)
CREAT SERPL-MCNC: 0.61 MG/DL — SIGNIFICANT CHANGE UP (ref 0.5–1.3)
EGFR: 96 ML/MIN/1.73M2 — SIGNIFICANT CHANGE UP
EOSINOPHIL # BLD AUTO: 0.13 K/UL — SIGNIFICANT CHANGE UP (ref 0–0.5)
EOSINOPHIL NFR BLD AUTO: 1.9 % — SIGNIFICANT CHANGE UP (ref 0–6)
GLUCOSE SERPL-MCNC: 69 MG/DL — LOW (ref 70–99)
HCT VFR BLD CALC: 31.9 % — LOW (ref 34.5–45)
HGB BLD-MCNC: 10.9 G/DL — LOW (ref 11.5–15.5)
IMM GRANULOCYTES NFR BLD AUTO: 0.3 % — SIGNIFICANT CHANGE UP (ref 0–0.9)
LYMPHOCYTES # BLD AUTO: 0.67 K/UL — LOW (ref 1–3.3)
LYMPHOCYTES # BLD AUTO: 9.8 % — LOW (ref 13–44)
MCHC RBC-ENTMCNC: 28.7 PG — SIGNIFICANT CHANGE UP (ref 27–34)
MCHC RBC-ENTMCNC: 34.2 GM/DL — SIGNIFICANT CHANGE UP (ref 32–36)
MCV RBC AUTO: 83.9 FL — SIGNIFICANT CHANGE UP (ref 80–100)
MONOCYTES # BLD AUTO: 0.47 K/UL — SIGNIFICANT CHANGE UP (ref 0–0.9)
MONOCYTES NFR BLD AUTO: 6.9 % — SIGNIFICANT CHANGE UP (ref 2–14)
NEUTROPHILS # BLD AUTO: 5.52 K/UL — SIGNIFICANT CHANGE UP (ref 1.8–7.4)
NEUTROPHILS NFR BLD AUTO: 80.8 % — HIGH (ref 43–77)
NRBC # BLD: 0 /100 WBCS — SIGNIFICANT CHANGE UP (ref 0–0)
PLATELET # BLD AUTO: 204 K/UL — SIGNIFICANT CHANGE UP (ref 150–400)
POTASSIUM SERPL-MCNC: 3.9 MMOL/L — SIGNIFICANT CHANGE UP (ref 3.5–5.3)
POTASSIUM SERPL-SCNC: 3.9 MMOL/L — SIGNIFICANT CHANGE UP (ref 3.5–5.3)
RBC # BLD: 3.8 M/UL — SIGNIFICANT CHANGE UP (ref 3.8–5.2)
RBC # FLD: 12.9 % — SIGNIFICANT CHANGE UP (ref 10.3–14.5)
SODIUM SERPL-SCNC: 139 MMOL/L — SIGNIFICANT CHANGE UP (ref 135–145)
WBC # BLD: 6.83 K/UL — SIGNIFICANT CHANGE UP (ref 3.8–10.5)
WBC # FLD AUTO: 6.83 K/UL — SIGNIFICANT CHANGE UP (ref 3.8–10.5)

## 2023-05-22 PROCEDURE — 74018 RADEX ABDOMEN 1 VIEW: CPT

## 2023-05-22 PROCEDURE — 88307 TISSUE EXAM BY PATHOLOGIST: CPT

## 2023-05-22 PROCEDURE — 88360 TUMOR IMMUNOHISTOCHEM/MANUAL: CPT

## 2023-05-22 PROCEDURE — 36415 COLL VENOUS BLD VENIPUNCTURE: CPT

## 2023-05-22 PROCEDURE — 85025 COMPLETE CBC W/AUTO DIFF WBC: CPT

## 2023-05-22 PROCEDURE — C1889: CPT

## 2023-05-22 PROCEDURE — 88331 PATH CONSLTJ SURG 1 BLK 1SPC: CPT

## 2023-05-22 PROCEDURE — 86901 BLOOD TYPING SEROLOGIC RH(D): CPT

## 2023-05-22 PROCEDURE — 74240 X-RAY XM UPR GI TRC 1CNTRST: CPT

## 2023-05-22 PROCEDURE — 87205 SMEAR GRAM STAIN: CPT

## 2023-05-22 PROCEDURE — 88184 FLOWCYTOMETRY/ TC 1 MARKER: CPT

## 2023-05-22 PROCEDURE — 90792 PSYCH DIAG EVAL W/MED SRVCS: CPT

## 2023-05-22 PROCEDURE — 84100 ASSAY OF PHOSPHORUS: CPT

## 2023-05-22 PROCEDURE — 88342 IMHCHEM/IMCYTCHM 1ST ANTB: CPT

## 2023-05-22 PROCEDURE — 86900 BLOOD TYPING SEROLOGIC ABO: CPT

## 2023-05-22 PROCEDURE — 80048 BASIC METABOLIC PNL TOTAL CA: CPT

## 2023-05-22 PROCEDURE — 88185 FLOWCYTOMETRY/TC ADD-ON: CPT

## 2023-05-22 PROCEDURE — 86850 RBC ANTIBODY SCREEN: CPT

## 2023-05-22 PROCEDURE — 88309 TISSUE EXAM BY PATHOLOGIST: CPT

## 2023-05-22 PROCEDURE — 88305 TISSUE EXAM BY PATHOLOGIST: CPT

## 2023-05-22 PROCEDURE — 80053 COMPREHEN METABOLIC PANEL: CPT

## 2023-05-22 PROCEDURE — 88333 PATH CONSLTJ SURG CYTO XM 1: CPT

## 2023-05-22 PROCEDURE — S2900: CPT

## 2023-05-22 PROCEDURE — 88341 IMHCHEM/IMCYTCHM EA ADD ANTB: CPT

## 2023-05-22 PROCEDURE — 83735 ASSAY OF MAGNESIUM: CPT

## 2023-05-22 PROCEDURE — 85027 COMPLETE CBC AUTOMATED: CPT

## 2023-05-22 RX ORDER — QUETIAPINE FUMARATE 200 MG/1
1 TABLET, FILM COATED ORAL
Qty: 30 | Refills: 0
Start: 2023-05-22 | End: 2023-06-20

## 2023-05-22 RX ORDER — VORTIOXETINE 5 MG/1
1 TABLET, FILM COATED ORAL
Refills: 0 | DISCHARGE

## 2023-05-22 RX ORDER — ESCITALOPRAM OXALATE 10 MG/1
1 TABLET, FILM COATED ORAL
Refills: 0 | DISCHARGE

## 2023-05-22 RX ORDER — QUETIAPINE FUMARATE 200 MG/1
1 TABLET, FILM COATED ORAL
Refills: 0 | DISCHARGE

## 2023-05-22 RX ADMIN — SERTRALINE 50 MILLIGRAM(S): 25 TABLET, FILM COATED ORAL at 12:01

## 2023-05-22 RX ADMIN — PANTOPRAZOLE SODIUM 40 MILLIGRAM(S): 20 TABLET, DELAYED RELEASE ORAL at 06:31

## 2023-05-22 RX ADMIN — MEMANTINE HYDROCHLORIDE 10 MILLIGRAM(S): 10 TABLET ORAL at 17:21

## 2023-05-22 RX ADMIN — ENOXAPARIN SODIUM 40 MILLIGRAM(S): 100 INJECTION SUBCUTANEOUS at 05:51

## 2023-05-22 RX ADMIN — LOSARTAN POTASSIUM 50 MILLIGRAM(S): 100 TABLET, FILM COATED ORAL at 05:50

## 2023-05-22 RX ADMIN — MEMANTINE HYDROCHLORIDE 10 MILLIGRAM(S): 10 TABLET ORAL at 05:51

## 2023-05-22 RX ADMIN — ESCITALOPRAM OXALATE 10 MILLIGRAM(S): 10 TABLET, FILM COATED ORAL at 12:01

## 2023-05-22 NOTE — BH CONSULTATION LIAISON ASSESSMENT NOTE - NSICDXPASTMEDICALHX_GEN_ALL_CORE_FT
PAST MEDICAL HISTORY:  Anxiety and depression     Dementia     HLD (hyperlipidemia)     Hypertension     Malignant neoplasm of stomach

## 2023-05-22 NOTE — BH CONSULTATION LIAISON ASSESSMENT NOTE - NSICDXBHSECONDARYDX_PSY_ALL_CORE
Malignant neoplasm of stomach   C16.9  HTN (hypertension)   I10  HLD (hyperlipidemia)   E78.5  Anxiety and depression   F41.9  Dementia   F03.90

## 2023-05-22 NOTE — BH CONSULTATION LIAISON ASSESSMENT NOTE - NSSUICPROTFACT_PSY_ALL_CORE
Responsibility to children, family, or others/Identifies reasons for living/Supportive social network of family or friends/Fear of death or the actual act of killing self/Cultural, spiritual and/or moral attitudes against suicide/Sikh beliefs

## 2023-05-22 NOTE — PROGRESS NOTE ADULT - SUBJECTIVE AND OBJECTIVE BOX
INTERVAL HPI/OVERNIGHT EVENTS:  Pt resting comfortably. No acute complaints.   NPO.  +BM.  Denies N/V    MEDICATIONS  (STANDING):  enalaprilat Injectable 1.25 milliGRAM(s) IV Push every 6 hours  enoxaparin Injectable 40 milliGRAM(s) SubCutaneous every 24 hours  lactated ringers. 1000 milliLiter(s) (100 mL/Hr) IV Continuous <Continuous>  pantoprazole  Injectable 40 milliGRAM(s) IV Push daily    MEDICATIONS  (PRN):  HYDROmorphone  Injectable 0.5 milliGRAM(s) IV Push every 3 hours PRN Severe Pain (7 - 10)  ondansetron Injectable 4 milliGRAM(s) IV Push every 6 hours PRN Nausea and/or Vomiting    Vital Signs Last 24 Hrs  T(C): 36.9 (20 May 2023 06:03), Max: 37.1 (19 May 2023 20:40)  T(F): 98.4 (20 May 2023 06:03), Max: 98.8 (19 May 2023 20:40)  HR: 58 (20 May 2023 06:03) (57 - 66)  BP: 136/66 (20 May 2023 06:03) (123/53 - 186/66)  BP(mean): 81 (20 May 2023 06:03) (81 - 96)  RR: 18 (20 May 2023 06:03) (18 - 18)  SpO2: 100% (20 May 2023 06:03) (99% - 100%)    Parameters below as of 20 May 2023 06:03  Patient On (Oxygen Delivery Method): room air    Physical:  General: A&Ox3. NAD.  Abdomen: Soft nondistended, nontender. Dressing C/D/I    I&O's Detail    19 May 2023 07:01  -  20 May 2023 07:00  --------------------------------------------------------  IN:  Total IN: 0 mL    OUT:    Voided (mL): 350 mL  Total OUT: 350 mL    Total NET: -350 mL    LABS:                        10.8   10.19 )-----------( 176      ( 20 May 2023 05:21 )             32.2             05-20    144  |  112<H>  |  16  ----------------------------<  70  4.4   |  25  |  0.82    Ca    9.1      20 May 2023 05:21  Phos  3.5     05-20  Mg     2.0     05-20    TPro  6.2  /  Alb  3.0<L>  /  TBili  0.6  /  DBili  x   /  AST  77<H>  /  ALT  119<H>  /  AlkPhos  97  05-20    
Patient seen and examined at the bedside.  POD#1 s/p Robotic assisted laparoscopic distal gastrectomy, D2 lymphadenectomy with billroth 2 reconstruction, biopsy of mesenteric lymph node.  Recovering well so far.  Pain controlled. Overnight, patient removed bang, NG tube and all of her dressings.  New dressings applied this morning.  Afebrile, vitals stable. Denies abdominal pain.  Denies nausea, vomiting.  Remains NPO. Voided s/p bang removal. Has ambulated post-op.  No reported bowel function.     Vital Signs Last 24 Hrs  T(C): 37.1 (19 May 2023 06:00), Max: 37.8 (18 May 2023 23:48)  T(F): 98.8 (19 May 2023 06:00), Max: 100 (18 May 2023 23:48)  HR: 59 (19 May 2023 06:00) (54 - 80)  BP: 150/72 (19 May 2023 06:00) (124/47 - 150/72)  BP(mean): 75 (18 May 2023 23:48) (67 - 90)  RR: 18 (19 May 2023 06:00) (12 - 18)  SpO2: 97% (19 May 2023 06:00) (97% - 100%)    Parameters below as of 19 May 2023 06:00  Patient On (Oxygen Delivery Method): room air    Physical:   GA: AAOx1, NAD  CVS: RRR  Abd: soft, nontender, nondistended.  new dressings in place, c/d/i.  incisions well appearing.   MSK: FROM x4                          11.9   11.69 )-----------( 201      ( 18 May 2023 13:45 )             36.1   05-18    140  |  111<H>  |  21<H>  ----------------------------<  125<H>  4.1   |  25  |  1.28    Ca    8.8      18 May 2023 13:45  Phos  4.5     05-18  Mg     2.0     05-18    TPro  6.4  /  Alb  3.3<L>  /  TBili  0.5  /  DBili  x   /  AST  243<H>  /  ALT  230<H>  /  AlkPhos  110  05-18  
S: Patient seen and examined at bedside. No acute overnight events. Hemodynamically stable, afebrile. Patient feels well and is currently alert and oriented x3. Having BMs and passing flatus, tolerating solid diet. Restarted on PO psych meds yesterday w/improvement in mental status today.    O:  Vital Signs Last 24 Hrs  T(C): 36.9 (22 May 2023 05:35), Max: 36.9 (22 May 2023 05:35)  T(F): 98.4 (22 May 2023 05:35), Max: 98.4 (22 May 2023 05:35)  HR: 59 (22 May 2023 05:35) (56 - 71)  BP: 156/66 (22 May 2023 05:35) (142/60 - 174/82)  BP(mean): 86 (22 May 2023 05:35) (86 - 86)  RR: 17 (22 May 2023 05:35) (16 - 17)  SpO2: 100% (22 May 2023 05:35) (100% - 100%)    Parameters below as of 22 May 2023 05:35  Patient On (Oxygen Delivery Method): room air    Physical Examination:  Gen: Awake, alert, oriented x3 and in no acute distress  Pulm: Normal work of breathing on room air  Abd: Soft, non-distended, non-tender. No guarding, rigidity, or rebound. Incision sites are CDI w/no dehiscence, surrounding erythema, or purulence  Ext: Moving all extremities equally                          10.9   6.83  )-----------( 204      ( 22 May 2023 06:20 )             31.9   05-21    140  |  111<H>  |  12  ----------------------------<  70  4.1   |  25  |  0.64    Ca    8.8      21 May 2023 05:35    A: 73y.o. Female s/p partial gastrectomy with B2    P:  - C/w jeff phase 3 diet - currently tolerating  - Pain control  - CTM mental status - currently oriented x3  - Possible dc today if remains tolerating diet
Vital Signs Last 24 Hrs  T(C): 36.9 (22 May 2023 05:35), Max: 36.9 (22 May 2023 05:35)  T(F): 98.4 (22 May 2023 05:35), Max: 98.4 (22 May 2023 05:35)  HR: 59 (22 May 2023 05:35) (59 - 71)  BP: 156/66 (22 May 2023 05:35) (156/66 - 174/82)  BP(mean): 86 (22 May 2023 05:35) (86 - 86)  RR: 17 (22 May 2023 05:35) (16 - 17)  SpO2: 100% (22 May 2023 05:35) (100% - 100%)    Parameters below as of 22 May 2023 05:35  Patient On (Oxygen Delivery Method): room air        I&O's Detail    21 May 2023 07:01  -  22 May 2023 07:00  --------------------------------------------------------  IN:    Oral Fluid: 28 mL  Total IN: 28 mL    OUT:  Total OUT: 0 mL    Total NET: 28 mL                                10.9   6.83  )-----------( 204      ( 22 May 2023 06:20 )             31.9       05-22    139  |  108  |  10  ----------------------------<  69<L>  3.9   |  24  |  0.61    Ca    9.1      22 May 2023 06:20    Port sites clear  Tolerating Diet                PLAN:  Ambulate  Discharge home tomorrow        
INTERVAL HPI/OVERNIGHT EVENTS:  No acute events overnight. Pt resting comfortably. No acute complaints. Tolerating clears.     MEDICATIONS  (STANDING):  enalaprilat Injectable 1.25 milliGRAM(s) IV Push every 6 hours  enoxaparin Injectable 40 milliGRAM(s) SubCutaneous every 24 hours  lactated ringers. 1000 milliLiter(s) (100 mL/Hr) IV Continuous <Continuous>  pantoprazole  Injectable 40 milliGRAM(s) IV Push daily    MEDICATIONS  (PRN):  HYDROmorphone  Injectable 0.5 milliGRAM(s) IV Push every 4 hours PRN Moderate Pain (4 - 6)  ondansetron Injectable 4 milliGRAM(s) IV Push every 6 hours PRN Nausea and/or Vomiting      Vital Signs Last 24 Hrs  T(C): 36.6 (21 May 2023 05:54), Max: 37.2 (20 May 2023 20:29)  T(F): 97.9 (21 May 2023 05:54), Max: 98.9 (20 May 2023 20:29)  HR: 62 (21 May 2023 05:54) (55 - 70)  BP: 145/56 (21 May 2023 05:54) (142/66 - 165/64)  BP(mean): 93 (21 May 2023 05:54) (93 - 93)  RR: 16 (21 May 2023 05:54) (16 - 18)  SpO2: 99% (21 May 2023 05:54) (99% - 100%)    Parameters below as of 21 May 2023 00:23  Patient On (Oxygen Delivery Method): room air    Physical:  General: Alert and oriented, not in acute distress  Resp: Breathing unlabored  Abdomen: Soft, nondistended, nontender; dressing c/d/i  : No bang catheter, no dysuria or hematuria  Extremities: No pedal edema    LABS:                      10.6   9.64  )-----------( 200      ( 21 May 2023 05:35 )             31.2             05-21    140  |  111<H>  |  12  ----------------------------<  70  4.1   |  25  |  0.64    Ca    8.8      21 May 2023 05:35  Phos  3.5     05-20  Mg     2.0     05-20    TPro  6.2  /  Alb  3.0<L>  /  TBili  0.6  /  DBili  x   /  AST  77<H>  /  ALT  119<H>  /  AlkPhos  97  05-20

## 2023-05-22 NOTE — BH CONSULTATION LIAISON ASSESSMENT NOTE - PAST PSYCHOTROPIC MEDICATION
On Klonopin 1 mg PO HS PRN as per family, Seroquel 25 mg PO HS, Lexapro 10 mg PO daily, Memantine 10 mg PO BID, Trintellix 10 mg PO daily, Zoloft 50 mg PO daily

## 2023-05-22 NOTE — DISCHARGE NOTE PROVIDER - NSDCFUADDINST_GEN_ALL_CORE_FT
For pain take over the counter Tylenol and Motrin alternating every 6 hours as needed. Resume home medication. You may shower, allow soap and water to rinse down incision site, do not scrub the area. For diet its important that you have frequent small meals throughout the day. Continue to drink plenty of fluids throughout the day. Do not lift anything heavier than 15 lbs for 6 weeks.  For pain take over the counter Tylenol and Motrin alternating every 6 hours as needed. You may shower, allow soap and water to rinse down incision site, do not scrub the area. For diet its important that you have frequent small meals throughout the day. Continue to drink plenty of fluids throughout the day. Do not lift anything heavier than 15 lbs for 6 weeks. The following adjustments were made to your home medication as per psychiatry recommendations: Seroquel 50 mg at night at bedtime, Lexapro and Trintellix to be discontinued. Continue taking all other home medication. Please follow up with an outpatient Psychiatrist; call the following number to make an appointment: [phone].

## 2023-05-22 NOTE — BH CONSULTATION LIAISON ASSESSMENT NOTE - NSBHCHARTREVIEWLAB_PSY_A_CORE FT
CBC Full  -  ( 22 May 2023 06:20 )  WBC Count : 6.83 K/uL  RBC Count : 3.80 M/uL  Hemoglobin : 10.9 g/dL  Hematocrit : 31.9 %  Platelet Count - Automated : 204 K/uL  Mean Cell Volume : 83.9 fl  Mean Cell Hemoglobin : 28.7 pg  Mean Cell Hemoglobin Concentration : 34.2 gm/dL  Auto Neutrophil # : 5.52 K/uL  Auto Lymphocyte # : 0.67 K/uL  Auto Monocyte # : 0.47 K/uL  Auto Eosinophil # : 0.13 K/uL  Auto Basophil # : 0.02 K/uL  Auto Neutrophil % : 80.8 %  Auto Lymphocyte % : 9.8 %  Auto Monocyte % : 6.9 %  Auto Eosinophil % : 1.9 %  Auto Basophil % : 0.3 %

## 2023-05-22 NOTE — DISCHARGE NOTE PROVIDER - NSDCCPCAREPLAN_GEN_ALL_CORE_FT
PRINCIPAL DISCHARGE DIAGNOSIS  Diagnosis: Malignant neoplasm of stomach  Assessment and Plan of Treatment:

## 2023-05-22 NOTE — BH CONSULTATION LIAISON ASSESSMENT NOTE - CURRENT MEDICATION
MEDICATIONS  (STANDING):  atorvastatin 10 milliGRAM(s) Oral at bedtime  enoxaparin Injectable 40 milliGRAM(s) SubCutaneous every 24 hours  escitalopram 10 milliGRAM(s) Oral daily  losartan 50 milliGRAM(s) Oral daily  memantine 10 milliGRAM(s) Oral two times a day  pantoprazole    Tablet 40 milliGRAM(s) Oral before breakfast  QUEtiapine 25 milliGRAM(s) Oral at bedtime  sertraline 50 milliGRAM(s) Oral daily    MEDICATIONS  (PRN):  acetaminophen     Tablet .. 650 milliGRAM(s) Oral every 6 hours PRN Mild Pain (1 - 3)  ondansetron Injectable 4 milliGRAM(s) IV Push every 6 hours PRN Nausea and/or Vomiting  traMADol 50 milliGRAM(s) Oral every 6 hours PRN Moderate Pain (4 - 6)

## 2023-05-22 NOTE — DISCHARGE NOTE PROVIDER - HOSPITAL COURSE
73 year old  female with PMH of HTN, HLD, anxiety and depression, dementia for years who presents with c/o gastric cancer. As per pt's sister, pt underwent EGD that revealed gastric polyps. Pathology revealed gastric cancer.  Pt  underwent robotic assisted distal gastrectomy with esophagoduodenoscopy with reconstruction on 5/18/2023. Patient did well post operatively and progressed as expected. On post operative day two patient was started on clears and tolerated, diet was subsequently advanced the following day. Had return of bowel function and ambulated. Patient discharged with follow up appointment.

## 2023-05-22 NOTE — BH CONSULTATION LIAISON ASSESSMENT NOTE - SUMMARY
72 y/o F with a hx of dementia, depression, HTN and HLD, who is admitted due to gastric CA s/p robotic assisted distal gastrectomy with esophagoduodenoscopy with reconstruction. She is consulted due to a family request. Patient with an underlying neurocognitive disorder and what seem to be increasing episodes of behavioral disturbances at home. She also appears to have suffered from acute post-surgical delirium, that has now resolved. She has numerous antidepressant medications, with none of them at optimize levels. Will consider simplifying her polypharmacy. She is not suicidal, homicidal or psychotic.    -To decrease polypharmacy, consider discontinuing Trintellix and Lexapro  -Cont Zoloft 50 mg PO daily  -Consider increasing Seroquel to 50 mg PO HS  -Cont Klonopin 1 mg PO HS PRN for now  -Needs an outpatient psychiatric f/u  -No need for an inpatient psychiatric admission or 1:1  -SW f/u  -Case discussed with the primary team  -Please reconsult as needed

## 2023-05-22 NOTE — DISCHARGE NOTE NURSING/CASE MANAGEMENT/SOCIAL WORK - NSDCVIVACCINE_GEN_ALL_CORE_FT
Tdap; 10-Oct-2016 18:26; Carolina Mayes (IAIN); [employer]; [id number]; IntraMuscular; Deltoid Left.; 0.5 milliLiter(s); VIS (VIS Published: 09-May-2013, VIS Presented: 10-Oct-2016);

## 2023-05-22 NOTE — DISCHARGE NOTE PROVIDER - NSDCMRMEDTOKEN_GEN_ALL_CORE_FT
atorvastatin 20 mg oral tablet: 1 tab(s) orally once a day (at bedtime)  clonazePAM 1 mg oral tablet: 1 tab(s) orally once a day (at bedtime)  escitalopram 10 mg oral tablet: 1 tab(s) orally once a day  memantine 10 mg oral tablet: 1 tab(s) orally 2 times a day  Protonix 40 mg oral delayed release tablet: 1 tab(s) orally once a day  QUEtiapine 25 mg oral tablet: 1 tab(s) orally once a day (at bedtime)  sertraline 50 mg oral tablet: 1 tab(s) orally once a day  sucralfate 1 g oral tablet: 1 tab(s) orally 2 times a day  telmisartan 40 mg oral tablet: 1 tab(s) orally once a day  Trintellix 10 mg oral tablet: 1 tab(s) orally once a day   atorvastatin 20 mg oral tablet: 1 tab(s) orally once a day (at bedtime)  clonazePAM 1 mg oral tablet: 1 tab(s) orally once a day (at bedtime)  memantine 10 mg oral tablet: 1 tab(s) orally 2 times a day  Protonix 40 mg oral delayed release tablet: 1 tab(s) orally once a day  Seroquel 50 mg oral tablet: 1 tab(s) orally once a day (at bedtime)  sertraline 50 mg oral tablet: 1 tab(s) orally once a day  sucralfate 1 g oral tablet: 1 tab(s) orally 2 times a day  telmisartan 40 mg oral tablet: 1 tab(s) orally once a day

## 2023-05-22 NOTE — DISCHARGE NOTE PROVIDER - CARE PROVIDER_API CALL
Oswaldo Jackson)  Surgery  [address] [facility], [place] [address]  Phone: [phone]  Fax: [phone]  Follow Up Time: 2 weeks

## 2023-05-22 NOTE — BH CONSULTATION LIAISON ASSESSMENT NOTE - HPI (INCLUDE ILLNESS QUALITY, SEVERITY, DURATION, TIMING, CONTEXT, MODIFYING FACTORS, ASSOCIATED SIGNS AND SYMPTOMS)
74 y/o F with a hx of dementia, depression, HTN and HLD, who is admitted due to gastric CA s/p robotic assisted distal gastrectomy with esophagoduodenoscopy with reconstruction. She is consulted due to a family request. As per family, the patient was not herself several days ago after surgery with episodes of agitation, confusion and pulling out IV lines. The patient's family is also concerned about her behavior at home and her medications. They report that the patient has worsening episodes of wandering, agitation, and memory deficits at home. They say that she receives numerous psychiatric medications from her PCP and that she needs a psychiatric evaluation. Patient was found awake, alert, and oriented to person, place and partially to time. She is circumstantial when explaining her medical and hospital course, but correctly explains that she came in for stomach cancer and had a surgical procedure. She shows no knowledge of suffering from dementia or of taking psychotropics. She denies any depression, hopelessness, worthlessness ir guilt. Denies any difficulties with sleep or appetite. She denies any death wishes, SI, HI or AVH.

## 2023-05-22 NOTE — DISCHARGE NOTE NURSING/CASE MANAGEMENT/SOCIAL WORK - WILL THE PATIENT ACCEPT THE PFIZER COVID-19 VACCINE IF ELIGIBLE AND IT IS AVAILABLE?
Physical Therapy Visit    Referred by: Joel Herzog MD; Medical Diagnosis (from order):    Diagnosis Information      Diagnosis    724.2, 724.3 (ICD-9-CM) - M54.42 (ICD-10-CM) - Acute bilateral low back pain with left-sided sciatica             Visit: 5  Visit Type: Daily Treatment Note    SUBJECTIVE                                                                                                               Patient states he feels he is a little better. Today he doesn't feel too bad. He notes just a little discomfort in the lower back and left lower extremity. If he stands or walks for longer periods of time he will get pulling into the right groin area. He feels the intensity of pain and pulling into the right groin might be a little less when it happens.   Functional Change: Getting out of a chair has gotten easier. Patient notes most difficulty with picking something off the ground and still with prolonged standing or walking.   Pain / Symptoms:  Pain rating (out of 10): Current: 2 ; Best: 2; Worst: 8  Location: Low back into left lower extermity    OBJECTIVE                                                                                                                             Palpation:   Comments / Details: Tightness/restrictions noted in right anterior hip/flexors into right lower quadrant in comparison to the left side    Muscle Flexibility:   Comments / Details:  Moderately decreased hamstring flexibility left > right   Moderately decreased piriformis flexibility left > right           TREATMENT                                                                                                                  Therapeutic Exercise:  Piriformis stretch in sitting modified with one leg up on table  -patient noted more tolerable today compared to last week  -if bends forward to far gets more/too much pulling into back and left leg  -went over just holding in position and initially working from a slouched  position to sitting up tall and to leaning forward as tolerated  -patient noted the longer he stretched it started to feel better with less pulling    Supine Swiss Ball roll-ins  - 1 x 10  Supine lower trunk rotation with Swiss Ball   - 1 x 20      Manual Therapy:  Patient supine with foam roller under knees   -soft tissue mobilization / myofascial release to right anterior hip/hip flexors, anterior iliac crest, into right lower quadrant    Skilled input: verbal instruction/cues    Writer verbally educated and received verbal consent for hand placement, positioning of patient, and techniques to be performed today from patient for hand placement and palpation for techniques as described above and how they are pertinent to the patient's plan of care.    Home Exercise Program/Education Materials: Access Code: [email or web address]  URL: [email or web address]  Date: 08/29/2022  Prepared by: Samantha Youngblood    Exercises  ·Supine Lower Trunk Rotation - 1-3 x daily - 7 x weekly - 1-3 sets - 10 reps  ·Supine Pelvic Tilt - 1-3 x daily - 7 x weekly - 1-3 sets - 10 reps  ·Seated Pelvic Tilt - 3-5 x daily - 7 x weekly - 1-3 sets - 10 reps  ·Seated Knee Flexion Extension AROM - 3-5 x daily - 7 x weekly - 1-3 sets - 10 reps  ·Seated Ankle Pumps - 3-5 x daily - 7 x weekly - 1-3 sets - 10 reps  ·Seated Hamstring Stretch - 1 x daily - 7 x weekly - 1 sets - 3-5 reps - 15-20 hold  ·Supine Figure 4 Piriformis Stretch - 1 x daily - 7 x weekly - 1 sets - 3-5 reps - 15-20 hold  ·Supine Piriformis Stretch with Foot on Ground - 1 x daily - 7 x weekly - 1 sets - 3-5 reps - 15-20 hold         ASSESSMENT                                                                                                             Patient presenting with more tightness, myofascial and visceral restrictions on the right at the anterior hip/hip flexors and into the right lower quadrant in comparison to the left side. When performing manual work into right  lower quadrant patient could feel tightness and pulling into his lower back. Patient noted his pain was slightly less after treatment compared to when he came in.     Pain/symptoms after session (out of 10): 2  Patient Education:   Results of above outlined education: Verbalizes understanding and Demonstrates understanding      PLAN                                                                                                                           Suggestions for next session as indicated: Progress per plan of care working on multiple myofascial restrictions, gentle mobility/range of motion/flexibility             Therapy procedure time and total treatment time can be found documented on the Time Entry flowsheet   No

## 2023-05-22 NOTE — BH CONSULTATION LIAISON ASSESSMENT NOTE - NSBHCHARTREVIEWVS_PSY_A_CORE FT
Vital Signs Last 24 Hrs  T(C): 36.8 (22 May 2023 14:35), Max: 36.9 (22 May 2023 05:35)  T(F): 98.2 (22 May 2023 14:35), Max: 98.4 (22 May 2023 05:35)  HR: 64 (22 May 2023 14:35) (59 - 71)  BP: 121/76 (22 May 2023 14:35) (121/76 - 174/82)  BP(mean): 86 (22 May 2023 05:35) (86 - 86)  RR: 18 (22 May 2023 14:35) (16 - 18)  SpO2: 98% (22 May 2023 14:35) (98% - 100%)    Parameters below as of 22 May 2023 14:35  Patient On (Oxygen Delivery Method): room air

## 2023-05-22 NOTE — DISCHARGE NOTE NURSING/CASE MANAGEMENT/SOCIAL WORK - PATIENT PORTAL LINK FT
You can access the FollowMyHealth Patient Portal offered by [facility] by registering at the following website: [email or web address]. By joining [employer]’s FollowMyHealth portal, you will also be able to view your health information using other applications (apps) compatible with our system.

## 2023-06-05 ENCOUNTER — APPOINTMENT (OUTPATIENT)
Dept: SURGICAL ONCOLOGY | Facility: CLINIC | Age: 73
End: 2023-06-05
Payer: MEDICARE

## 2023-06-05 VITALS
BODY MASS INDEX: 21.26 KG/M2 | SYSTOLIC BLOOD PRESSURE: 108 MMHG | DIASTOLIC BLOOD PRESSURE: 67 MMHG | HEART RATE: 67 BPM | HEIGHT: 63 IN | OXYGEN SATURATION: 95 % | WEIGHT: 120 LBS | RESPIRATION RATE: 16 BRPM

## 2023-06-05 PROCEDURE — 99024 POSTOP FOLLOW-UP VISIT: CPT

## 2023-06-05 RX ORDER — PANTOPRAZOLE 40 MG/1
40 TABLET, DELAYED RELEASE ORAL DAILY
Qty: 90 | Refills: 2 | Status: ACTIVE | COMMUNITY
Start: 2023-06-05 | End: 1900-01-01

## 2023-06-05 RX ORDER — SUCRALFATE 1 G/10ML
1 SUSPENSION ORAL
Qty: 10 | Refills: 1 | Status: ACTIVE | COMMUNITY
Start: 2023-06-05 | End: 1900-01-01

## 2023-06-07 ENCOUNTER — OUTPATIENT (OUTPATIENT)
Dept: OUTPATIENT SERVICES | Facility: HOSPITAL | Age: 73
LOS: 1 days | Discharge: HOME | End: 2023-06-07
Payer: MEDICARE

## 2023-06-07 PROCEDURE — 90833 PSYTX W PT W E/M 30 MIN: CPT | Mod: 95

## 2023-06-07 PROCEDURE — 90839 PSYTX CRISIS INITIAL 60 MIN: CPT | Mod: 95

## 2023-06-07 PROCEDURE — 99214 OFFICE O/P EST MOD 30 MIN: CPT | Mod: 95

## 2023-06-08 DIAGNOSIS — F02.818 DEMENTIA IN OTHER DISEASES CLASSIFIED ELSEWHERE, UNSPECIFIED SEVERITY, WITH OTHER BEHAVIORAL DISTURBANCE: ICD-10-CM

## 2023-06-19 ENCOUNTER — APPOINTMENT (OUTPATIENT)
Dept: SURGICAL ONCOLOGY | Facility: CLINIC | Age: 73
End: 2023-06-19
Payer: MEDICARE

## 2023-06-19 VITALS
HEART RATE: 62 BPM | HEIGHT: 63 IN | DIASTOLIC BLOOD PRESSURE: 73 MMHG | SYSTOLIC BLOOD PRESSURE: 129 MMHG | BODY MASS INDEX: 19.31 KG/M2 | WEIGHT: 109 LBS

## 2023-06-19 PROCEDURE — 99024 POSTOP FOLLOW-UP VISIT: CPT

## 2023-06-28 ENCOUNTER — FORM ENCOUNTER (OUTPATIENT)
Age: 73
End: 2023-06-28

## 2023-06-29 ENCOUNTER — FORM ENCOUNTER (OUTPATIENT)
Age: 73
End: 2023-06-29

## 2023-07-13 NOTE — HISTORY OF PRESENT ILLNESS
[de-identified] : Ms. GABY BOYKIN is a 73 year old female who present today for a post-op visit \par Referred Dr. 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 \par EUS 4/26/23: 1 cm raised mid body anterior wall of stomach and 1.2 cm gastric polypoid lesion in the posterior wall. Both location tattooed. T1 \par \par Gaby denies abdominal pain, bloating, nausea/vomiting, bowel habit changes, hematochezia, black sticky stools, fever, chills, night sweats or weight loss. \par \par CT A/P 5/9/2023- Enlarged mesenteric lymph nodes measuring up to 2.2 x 1.5 cm with haziness of the adjacent fat of uncertain etiology; differential includes neoplasm including lymphoma, metastatic disease from the patient's known gastric cancer or from a different primary tumor; further evaluation is recommended with either tissue sampling or PET/CT.\par \par PET/CT 5/16/2023 1. No definite abnormal focal uptake in the stomach.\par 2. Known enlarged mesenteric lymph nodes do not appear to exhibit any abnormal FDG uptake. Further evaluation with a DOTATATE PET scan or tissue sampling may be of additional value as clinically indicated.\par 3. Additional small subcentimeter mesenteric lymph nodes that appear to exhibit mild FDG uptake versus less likely misregistration from adjacent bowel activity are of uncertain clinical significance and may be inflammatory or neoplastic in nature. Further correlation as clinically indicated.\par \par **SURGERY**\par s/p robotic assisted distal gastrectomy w/ esophagoduodenostomy 5/18/2023 \par \par PCP: Dr. Reyes \par

## 2023-07-13 NOTE — PHYSICAL EXAM
[FreeTextEntry1] : COVID-19 precautions as per [facility] policy was universally followed\par  [de-identified] : Abdomen soft, non-tender, non-distended, and no palpable masses.

## 2023-07-13 NOTE — CONSULT LETTER
[Consult Letter:] : I had the pleasure of evaluating your patient, [unfilled]. [( Thank you for referring [unfilled] for consultation for _____ )] : Thank you for referring [unfilled] for consultation for [unfilled] [Please see my note below.] : Please see my note below. [Consult Closing:] : Thank you very much for allowing me to participate in the care of this patient.  If you have any questions, please do not hesitate to contact me. [Sincerely,] : Sincerely, [Dear  ___] : Dear  [unfilled], [FreeTextEntry2] : Dr. Palomino  [FreeTextEntry3] : Oswaldo Matthews MD, FICS, FACS\par Director of Surgical Oncology- [facility] \par , Department of Surgery  \par [employer] [facility] \par [address]\[place] [address]\par \par [address]\[place] [address]\par \par [address]\[place] [address]\par \par (mob) [phone]\par (o) [phone]\par (f) [phone]\par

## 2023-07-13 NOTE — PHYSICAL EXAM
[Normal] : supple, no neck mass and thyroid not enlarged [Normal Neck Lymph Nodes] : normal neck lymph nodes  [Normal Supraclavicular Lymph Nodes] : normal supraclavicular lymph nodes [Normal Groin Lymph Nodes] : normal groin lymph nodes [Normal Axillary Lymph Nodes] : normal axillary lymph nodes [Normal] : oriented to person, place and time, with appropriate affect [FreeTextEntry1] : COVID-19 precautions as per [facility] policy was universally followed\par  [de-identified] : Abdomen soft, non-tender, non-distended, and no palpable masses.

## 2023-07-13 NOTE — ASSESSMENT
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 Both location tattooed. 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 Patient expressed verbal understanding to proceed with proposed plan. All questions answered. \par  \par \par

## 2023-07-13 NOTE — HISTORY OF PRESENT ILLNESS
[de-identified] : Ms. GABY BOYKIN is a 73 year old female who present today for a follow-up visit \par Referred Dr. 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 \par EUS 4/26/23: 1 cm raised mid body anterior wall of stomach and 1.2 cm gastric polypoid lesion in the posterior wall. Both location tattooed. T1 \par \par Gaby denies abdominal pain, bloating, nausea/vomiting, bowel habit changes, hematochezia, black sticky stools, fever, chills, night sweats or weight loss. \par \par CT A/P 5/9/2023- Enlarged mesenteric lymph nodes measuring up to 2.2 x 1.5 cm with haziness of the adjacent fat of uncertain etiology; differential includes neoplasm including lymphoma, metastatic disease from the patient's known gastric cancer or from a different primary tumor; further evaluation is recommended with either tissue sampling or PET/CT.\par \par PET/CT 5/16/2023 1. No definite abnormal focal uptake in the stomach.\par 2. Known enlarged mesenteric lymph nodes do not appear to exhibit any abnormal FDG uptake. Further evaluation with a DOTATATE PET scan or tissue sampling may be of additional value as clinically indicated.\par 3. Additional small subcentimeter mesenteric lymph nodes that appear to exhibit mild FDG uptake versus less likely misregistration from adjacent bowel activity are of uncertain clinical significance and may be inflammatory or neoplastic in nature. 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 Reyes \par 
No lesions; no rash

## 2023-07-13 NOTE — CONSULT LETTER
[Dear  ___] : Dear  [unfilled], [( Thank you for referring [unfilled] for consultation for _____ )] : Thank you for referring [unfilled] for consultation for [unfilled] [FreeTextEntry3] : Oswaldo Matthews MD, FICS, FACS\par Director of Surgical Oncology- [facility] \par , Department of Surgery  \par [employer] [facility] [employer] [facility] \par [address]\[place] [address]\par \par [address]\[place] [address]\par \par [address]\[place] [address]\par \par (mob) [phone]\par (o) [phone]\par (f) [phone]\par

## 2023-07-13 NOTE — ASSESSMENT
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 Both location tattooed. T1 \par \par **SURGERY**\par s/p robotic assisted distal subtotal gastrectomy w/ bill en Y gastrojejunostomy 5/18/2023 \par Final path- T1b N0 gastric adenoa ca R0 resection, with concurrent lymphoma\par PLAN: \par \par referred to Dr. Mohr\par RTO 3 months\par I have discussed the diagnosis, therapeutic plan and options with the patient at length. Patient expressed verbal understanding to proceed with proposed plan. All questions answered. \par  \par \par

## 2023-07-19 DIAGNOSIS — Z82.49 FAMILY HISTORY OF ISCHEMIC HEART DISEASE AND OTHER DISEASES OF THE CIRCULATORY SYSTEM: ICD-10-CM

## 2023-08-01 ENCOUNTER — APPOINTMENT (OUTPATIENT)
Dept: RADIATION ONCOLOGY | Facility: CLINIC | Age: 73
End: 2023-08-01
Payer: MEDICARE

## 2023-08-01 PROCEDURE — 99204 OFFICE O/P NEW MOD 45 MIN: CPT

## 2023-08-07 ENCOUNTER — APPOINTMENT (OUTPATIENT)
Dept: CT IMAGING | Facility: HOSPITAL | Age: 73
End: 2023-08-07

## 2023-08-07 ENCOUNTER — OUTPATIENT (OUTPATIENT)
Dept: OUTPATIENT SERVICES | Facility: HOSPITAL | Age: 73
LOS: 1 days | End: 2023-08-07
Payer: MEDICARE

## 2023-08-07 DIAGNOSIS — C82.93 FOLLICULAR LYMPHOMA, UNSPECIFIED, INTRA-ABDOMINAL LYMPH NODES: ICD-10-CM

## 2023-08-07 PROCEDURE — 77290 THER RAD SIMULAJ FIELD CPLX: CPT

## 2023-08-07 PROCEDURE — 77263 THER RADIOLOGY TX PLNG CPLX: CPT

## 2023-08-08 NOTE — DISEASE MANAGEMENT
[Pathological] : TNM Stage: p [FreeTextEntry4] : adenocarcinoma [TTNM] : 1a [NTNM] : 0 [MTNM] : x [I] : I

## 2023-08-08 NOTE — REVIEW OF SYSTEMS
[Negative] : Allergic/Immunologic [FreeTextEntry5] : RADHA [de-identified] : dementia [de-identified] : depression

## 2023-08-08 NOTE — HISTORY OF PRESENT ILLNESS
[FreeTextEntry1] : 72 y/o female with h/o dementia, HLD, GERD, depression, h/o surgery for gastric adenocarcinoma and lymphoma in 5/2023 presents to discuss radiation options for gastric cancer and lymphoma.  Initially upper endoscopy showed adenocarcinoma (intestinal type) in 3/2023. GI Mikey Wolfe.  5/2023 -- CT scan showed IMPRESSION: Enlarged mesenteric lymph nodes measuring up to 2.2 x 1.5 cm with haziness of the adjacent fat of uncertain etiology; differential includes neoplasm including lymphoma, metastatic disease from the patient's known gastric cancer or from a different primary tumor; further evaluation is recommended with either tissue sampling or PET/CT. 5/2023 -- PET scan showed IMPRESSION: 1. No definite abnormal focal uptake in the stomach. 2. Known enlarged mesenteric lymph nodes do not appear to exhibit any abnormal FDG uptake. Further evaluation with a DOTATATE PET scan or tissue sampling may be of additional value as clinically indicated. 3. Additional small subcentimeter mesenteric lymph nodes that appear to exhibit mild FDG uptake versus less likely misregistration from adjacent bowel activity are of uncertain clinical significance and may be inflammatory or neoplastic in nature. Further correlation as clinically indicated.  5/18/2023-- partial gastrectomy showed mesenteric LN follicular lymphoma, gastric adenocarcinoma. Stage [id number], margin negative.   8/1/2023 Consultation. c/o weight loss 20 LB in last 3 months, nausea. No abd pain.

## 2023-08-25 PROCEDURE — 77301 RADIOTHERAPY DOSE PLAN IMRT: CPT

## 2023-08-25 PROCEDURE — 77338 DESIGN MLC DEVICE FOR IMRT: CPT

## 2023-08-25 PROCEDURE — 77300 RADIATION THERAPY DOSE PLAN: CPT

## 2023-08-30 ENCOUNTER — NON-APPOINTMENT (OUTPATIENT)
Age: 73
End: 2023-08-30

## 2023-08-31 PROCEDURE — G6015: CPT

## 2023-08-31 PROCEDURE — 77014: CPT

## 2023-08-31 NOTE — REVIEW OF SYSTEMS
[Negative] : Allergic/Immunologic [Constipation: Grade 0] : Constipation: Grade 0 [Diarrhea: Grade 0] : Diarrhea: Grade 0 [Dyspepsia: Grade 0] : Dyspepsia: Grade 0 [Dysphagia: Grade 0] : Dysphagia: Grade 0 [Mucositis Oral: Grade 0] : Mucositis Oral: Grade 0  [Xerostomia: Grade 0] : Xerostomia: Grade 0 [Cough: Grade 0] : Cough: Grade 0 [FreeTextEntry5] : RADHA [de-identified] : dementia [de-identified] : depression

## 2023-08-31 NOTE — HISTORY OF PRESENT ILLNESS
[FreeTextEntry1] : 74 y/o female with h/o dementia, HLD, GERD, depression, h/o surgery for gastric adenocarcinoma and lymphoma in 5/2023 presents to discuss radiation options for gastric cancer and lymphoma.  Initially upper endoscopy showed adenocarcinoma (intestinal type) in 3/2023. GI Mikey Wolfe.  5/2023 -- CT scan showed IMPRESSION: Enlarged mesenteric lymph nodes measuring up to 2.2 x 1.5 cm with haziness of the adjacent fat of uncertain etiology; differential includes neoplasm including lymphoma, metastatic disease from the patient's known gastric cancer or from a different primary tumor; further evaluation is recommended with either tissue sampling or PET/CT. 5/2023 -- PET scan showed IMPRESSION: 1. No definite abnormal focal uptake in the stomach. 2. Known enlarged mesenteric lymph nodes do not appear to exhibit any abnormal FDG uptake. Further evaluation with a DOTATATE PET scan or tissue sampling may be of additional value as clinically indicated. 3. Additional small subcentimeter mesenteric lymph nodes that appear to exhibit mild FDG uptake versus less likely misregistration from adjacent bowel activity are of uncertain clinical significance and may be inflammatory or neoplastic in nature. Further correlation as clinically indicated.  5/18/2023-- partial gastrectomy showed mesenteric LN follicular lymphoma, gastric adenocarcinoma. Stage [id number], margin negative.   8/1/2023 Consultation. c/o weight loss 20 LB in last 3 months, nausea. No abd pain.   8/31/2023 OTV. 1/15 fractions of radiation for intra-abd / gastric lymphoma completed. No abd pain, nasuea. Not on chemotherapy (Med Onc Dr Mohr @ [facility]).

## 2023-08-31 NOTE — REASON FOR VISIT
[Routine On-Treatment] : a routine on-treatment visit for [Other: ___] : [unfilled] [Family Member] : family member [[employer] ] : provided by [employer]   [Interpreters_IDNumber] : [id number] [TWNoteComboBox1] : [place]

## 2023-08-31 NOTE — DISEASE MANAGEMENT
[Pathological] : TNM Stage: p [I] : I [FreeTextEntry4] : gastric adenocarcinoma [TTNM] : 1a [NTNM] : 0 [MTNM] : x [de-identified] : 30 Gy [de-identified] : abd -gastric (lymphoma)

## 2023-09-01 PROCEDURE — 77014: CPT

## 2023-09-01 PROCEDURE — G6015: CPT

## 2023-09-05 PROCEDURE — 77014: CPT

## 2023-09-05 PROCEDURE — G6015: CPT

## 2023-09-06 ENCOUNTER — NON-APPOINTMENT (OUTPATIENT)
Age: 73
End: 2023-09-06

## 2023-09-06 PROCEDURE — 77014: CPT

## 2023-09-06 PROCEDURE — G6015: CPT

## 2023-09-07 VITALS — BODY MASS INDEX: 19.67 KG/M2 | WEIGHT: 111 LBS | HEIGHT: 63 IN | RESPIRATION RATE: 18 BRPM

## 2023-09-07 PROCEDURE — G6015: CPT

## 2023-09-07 PROCEDURE — 77427 RADIATION TX MANAGEMENT X5: CPT

## 2023-09-07 PROCEDURE — 77014: CPT

## 2023-09-07 PROCEDURE — 77336 RADIATION PHYSICS CONSULT: CPT

## 2023-09-07 NOTE — HISTORY OF PRESENT ILLNESS
[FreeTextEntry1] : 72 y/o female with h/o dementia, HLD, GERD, depression, h/o surgery for gastric adenocarcinoma and lymphoma in 5/2023 presents to discuss radiation options for gastric cancer and lymphoma.  Initially upper endoscopy showed adenocarcinoma (intestinal type) in 3/2023. GI Mikey Wolfe.  5/2023 -- CT scan showed IMPRESSION: Enlarged mesenteric lymph nodes measuring up to 2.2 x 1.5 cm with haziness of the adjacent fat of uncertain etiology; differential includes neoplasm including lymphoma, metastatic disease from the patient's known gastric cancer or from a different primary tumor; further evaluation is recommended with either tissue sampling or PET/CT. 5/2023 -- PET scan showed IMPRESSION: 1. No definite abnormal focal uptake in the stomach. 2. Known enlarged mesenteric lymph nodes do not appear to exhibit any abnormal FDG uptake. Further evaluation with a DOTATATE PET scan or tissue sampling may be of additional value as clinically indicated. 3. Additional small subcentimeter mesenteric lymph nodes that appear to exhibit mild FDG uptake versus less likely misregistration from adjacent bowel activity are of uncertain clinical significance and may be inflammatory or neoplastic in nature. Further correlation as clinically indicated.  5/18/2023-- partial gastrectomy showed mesenteric LN follicular lymphoma, gastric adenocarcinoma. Stage [id number], margin negative.   8/1/2023 Consultation. c/o weight loss 20 LB in last 3 months, nausea. No abd pain.   9/7/2023 OTV. 5/15 fractions of radiation for intra-abd / gastric lymphoma completed. No abd pain, nasuea. Not on chemotherapy (Med Onc Dr Mohr @ [facility]).

## 2023-09-07 NOTE — DISEASE MANAGEMENT
[FreeTextEntry4] : gastric adenocarcinoma [TTNM] : 1a [NTNM] : 0 [MTNM] : x [de-identified] : 30 Gy [de-identified] : abd -gastric (lymphoma)

## 2023-09-08 PROCEDURE — 77014: CPT

## 2023-09-08 PROCEDURE — G6015: CPT

## 2023-09-11 PROCEDURE — G6015: CPT

## 2023-09-11 PROCEDURE — 77014: CPT

## 2023-09-12 PROCEDURE — 77014: CPT

## 2023-09-12 PROCEDURE — G6015: CPT

## 2023-09-13 ENCOUNTER — NON-APPOINTMENT (OUTPATIENT)
Age: 73
End: 2023-09-13

## 2023-09-13 PROCEDURE — G6015: CPT

## 2023-09-13 PROCEDURE — 77014: CPT

## 2023-09-14 PROCEDURE — 77014: CPT

## 2023-09-14 PROCEDURE — 77336 RADIATION PHYSICS CONSULT: CPT

## 2023-09-14 PROCEDURE — G6015: CPT

## 2023-09-14 PROCEDURE — 77427 RADIATION TX MANAGEMENT X5: CPT

## 2023-09-15 PROCEDURE — 77014: CPT

## 2023-09-15 PROCEDURE — G6015: CPT

## 2023-09-18 PROCEDURE — G6015: CPT

## 2023-09-18 PROCEDURE — 77014: CPT

## 2023-09-19 PROCEDURE — 77014: CPT

## 2023-09-19 PROCEDURE — G6015: CPT

## 2023-09-20 ENCOUNTER — APPOINTMENT (OUTPATIENT)
Dept: SURGICAL ONCOLOGY | Facility: CLINIC | Age: 73
End: 2023-09-20
Payer: MEDICARE

## 2023-09-20 VITALS
BODY MASS INDEX: 19.14 KG/M2 | HEIGHT: 63 IN | HEART RATE: 51 BPM | OXYGEN SATURATION: 100 % | WEIGHT: 108 LBS | SYSTOLIC BLOOD PRESSURE: 146 MMHG | DIASTOLIC BLOOD PRESSURE: 83 MMHG

## 2023-09-20 PROCEDURE — 99214 OFFICE O/P EST MOD 30 MIN: CPT

## 2023-09-20 PROCEDURE — G6015: CPT

## 2023-09-20 PROCEDURE — 77014: CPT

## 2023-09-21 ENCOUNTER — NON-APPOINTMENT (OUTPATIENT)
Age: 73
End: 2023-09-21

## 2023-09-21 PROCEDURE — G6015: CPT

## 2023-09-21 PROCEDURE — 77336 RADIATION PHYSICS CONSULT: CPT

## 2023-09-21 PROCEDURE — 77427 RADIATION TX MANAGEMENT X5: CPT

## 2023-09-21 PROCEDURE — 77014: CPT

## 2023-09-25 PROCEDURE — 99214 OFFICE O/P EST MOD 30 MIN: CPT

## 2023-10-25 ENCOUNTER — APPOINTMENT (OUTPATIENT)
Dept: RADIATION ONCOLOGY | Facility: CLINIC | Age: 73
End: 2023-10-25
Payer: MEDICARE

## 2023-10-25 PROCEDURE — 99024 POSTOP FOLLOW-UP VISIT: CPT

## 2023-11-27 PROCEDURE — 99214 OFFICE O/P EST MOD 30 MIN: CPT

## 2023-12-15 ENCOUNTER — NON-APPOINTMENT (OUTPATIENT)
Age: 73
End: 2023-12-15

## 2023-12-20 PROBLEM — C16.9 GASTRIC CANCER: Status: ACTIVE | Noted: 2023-04-19

## 2023-12-27 ENCOUNTER — APPOINTMENT (OUTPATIENT)
Dept: SURGICAL ONCOLOGY | Facility: CLINIC | Age: 73
End: 2023-12-27
Payer: MEDICARE

## 2023-12-27 VITALS
DIASTOLIC BLOOD PRESSURE: 71 MMHG | HEART RATE: 74 BPM | OXYGEN SATURATION: 100 % | BODY MASS INDEX: 19.31 KG/M2 | WEIGHT: 109 LBS | HEIGHT: 63 IN | SYSTOLIC BLOOD PRESSURE: 159 MMHG

## 2023-12-27 DIAGNOSIS — C16.9 MALIGNANT NEOPLASM OF STOMACH, UNSPECIFIED: ICD-10-CM

## 2023-12-27 PROCEDURE — 99214 OFFICE O/P EST MOD 30 MIN: CPT

## 2024-01-01 NOTE — ASSESSMENT
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  Path: intramucosal adenocarcinoma in a background of low & high grade dysplasia, 1 cm, invades lamina propria, no LVI or PNI, margins negative, 0/19 LN involved, pT1a N0 * mesenteric LN with path of follicular lymphoma  8/31/2023: Patient started RT for the follicular lymphoma with Dr. Naima Centeno. Scheduled to complete RT mid October.  9/21/2023 OTV. 15/15 fractions of radiation for intra-abd / gastric lymphoma completed.   10/29/2023 CT C/A/P ([facility]): S/P partial gastrectomy and gastrojejunostomy without evidence for recurrent or metastatic disease. B/L tiny benign-appearing pulmonary nodules. No suspicious nodule or mass--> f/u CT chest in 1 year is optional according to Fleischner guidelines.  PLAN: - Follow up with RT Dr. Centeno - Follow up with oncologist Dr. Mohr for lung nodules; imaging deferred to oncology. Scheduled to see him 1/2024 - Encouraged patient to try to eat more; she can try protein powder or shakes to help with weight gain. - RTO in 6 months  I have discussed the diagnosis, therapeutic plan and options with the patient at length. Patient expressed verbal understanding to proceed with proposed plan. All questions answered.

## 2024-01-01 NOTE — PHYSICAL EXAM
[FreeTextEntry1] : COVID-19 precautions as per [facility] policy was universally followed\par   [de-identified] : Abdomen soft, non-tender, non-distended, and no palpable masses.

## 2024-01-01 NOTE — HISTORY OF PRESENT ILLNESS
[de-identified] : Ms. GABY BOYKIN is a 73 year old female who presenting with gastric cancer s/p robotic assisted distal gastrectomy w/ esophagoduodenostomy 5/18/2023 here for a follow up visit. Referred Dr. Menendez  [facility]: HTN, HLD, Anxiety  Family History: denies  Endoscopy 3/29/23: Stomach biopsy: Intramucosa adenocarcinoma. Polypectomy: Intramucosa adenocarcinoma.  EUS 4/26/23: 1 cm raised mid body anterior wall of stomach and 1.2 cm gastric polypoid lesion in the posterior wall. Both location tattooed. T1   Gaby denies abdominal pain, bloating, nausea/vomiting, bowel habit changes, hematochezia, black sticky stools, fever, chills, night sweats or weight loss.   CT A/P 5/9/2023- Enlarged mesenteric lymph nodes measuring up to 2.2 x 1.5 cm with haziness of the adjacent fat of uncertain etiology; differential includes neoplasm including lymphoma, metastatic disease from the patient's known gastric cancer or from a different primary tumor; further evaluation is recommended with either tissue sampling or PET/CT.  PET/CT 5/16/2023 1. No definite abnormal focal uptake in the stomach. 2. Known enlarged mesenteric lymph nodes do not appear to exhibit any abnormal FDG uptake. Further evaluation with a DOTATATE PET scan or tissue sampling may be of additional value as clinically indicated. 3. Additional small subcentimeter mesenteric lymph nodes that appear to exhibit mild FDG uptake versus less likely misregistration from adjacent bowel activity are of uncertain clinical significance and may be inflammatory or neoplastic in nature. Further correlation as clinically indicated.  **SURGERY** s/p robotic assisted distal gastrectomy w/ esophagoduodenostomy 5/18/2023, path: intramucosal adenocarcinoma in a background of low & high grade dysplasia, 1 cm, invades lamina propria, no LVI or PNI, margins negative, 0/19 LN involved, pT1a N0 * mesenteric LN with path of follicular lymphoma  6/29/2023:  <2, CEA 2.2 (WNL)  8/31/2023: Patient started RT with Dr. Mcnamara.   9/20/2023: Currently not on chemo. Scheduled to complete RT mid October. She is complaining of decreased appetite. Denies abdominal pain, nausea, vomiting, fevers/chills.   9/21/2023 OTV. 15/15 fractions of radiation for intra-abd / gastric lymphoma completed.   10/29/2023 CT C/A/P (LHR): S/P partial gastrectomy and gastrojejunostomy without evidence for recurrent or metastatic disease.  B/L tiny benign-appearing pulmonary nodules. No suspicious nodule or mass--> f/u CT chest in 1 year is optional according to Fleischner guidelines.  12/27/2023: Today patient is doing well. Tolerating diet and having bowel function. Denies abdominal pain, nausea, vomiting, fevers/chills.   PCP: Dr. Eric Bergeron onc: Dr. Mcnamara Onc: Dr. Mohr

## 2024-01-01 NOTE — CONSULT LETTER
[FreeTextEntry2] : Dr. Menendez  [FreeTextEntry3] : Oswaldo Matthews MD, FICS, FACS Director of Surgical Oncology- [facility] , Department of Surgery [facility] Medicine at [address] [address]   (mob) [phone] (o) [phone] (f) [phone]

## 2024-01-26 ENCOUNTER — APPOINTMENT (OUTPATIENT)
Dept: RADIATION ONCOLOGY | Facility: CLINIC | Age: 74
End: 2024-01-26

## 2024-07-09 ENCOUNTER — APPOINTMENT (OUTPATIENT)
Dept: SURGICAL ONCOLOGY | Facility: CLINIC | Age: 74
End: 2024-07-09

## 2024-07-23 ENCOUNTER — APPOINTMENT (OUTPATIENT)
Dept: CT IMAGING | Facility: CLINIC | Age: 74
End: 2024-07-23

## 2024-08-12 ENCOUNTER — APPOINTMENT (OUTPATIENT)
Dept: NUCLEAR MEDICINE | Facility: CLINIC | Age: 74
End: 2024-08-12
Payer: MEDICAID

## 2024-08-12 PROCEDURE — 78815 PET IMAGE W/CT SKULL-THIGH: CPT | Mod: PS

## 2024-08-12 PROCEDURE — A9552: CPT

## 2024-08-14 ENCOUNTER — APPOINTMENT (OUTPATIENT)
Dept: SURGICAL ONCOLOGY | Facility: CLINIC | Age: 74
End: 2024-08-14

## 2024-08-14 VITALS
OXYGEN SATURATION: 98 % | BODY MASS INDEX: 20.38 KG/M2 | SYSTOLIC BLOOD PRESSURE: 144 MMHG | HEIGHT: 63 IN | HEART RATE: 62 BPM | WEIGHT: 115 LBS | DIASTOLIC BLOOD PRESSURE: 82 MMHG

## 2024-08-14 DIAGNOSIS — C16.9 MALIGNANT NEOPLASM OF STOMACH, UNSPECIFIED: ICD-10-CM

## 2024-08-14 PROCEDURE — 99214 OFFICE O/P EST MOD 30 MIN: CPT

## 2024-08-15 NOTE — PHYSICAL EXAM
[Normal] : supple, no neck mass and thyroid not enlarged [Normal Neck Lymph Nodes] : normal neck lymph nodes  [Normal Supraclavicular Lymph Nodes] : normal supraclavicular lymph nodes [Normal Groin Lymph Nodes] : normal groin lymph nodes [Normal Axillary Lymph Nodes] : normal axillary lymph nodes [Normal] : oriented to person, place and time, with appropriate affect [FreeTextEntry1] : COVID-19 precautions as per [facility] policy was universally followed\par   [de-identified] : Abdomen soft, non-tender, non-distended, and no palpable masses.

## 2024-08-15 NOTE — CONSULT LETTER
[Dear  ___] : Dear  [unfilled], [Courtesy Letter:] : I had the pleasure of seeing your patient, [unfilled], in my office today. [( Thank you for referring [unfilled] for consultation for _____ )] : Thank you for referring [unfilled] for consultation for [unfilled] [Please see my note below.] : Please see my note below. [Consult Closing:] : Thank you very much for allowing me to participate in the care of this patient.  If you have any questions, please do not hesitate to contact me. [Sincerely,] : Sincerely, [FreeTextEntry2] : Dr. Menendez  [FreeTextEntry3] : Oswaldo Matthews MD, FICS, FACS Director of Surgical Oncology- [facility] , Department of Surgery [facility] Medicine at [address] [address]   (mob) [phone] (o) [phone] (f) [phone]  [DrEdie  ___] : Dr. MIXON [DrEdie ___] : Dr. MIXON

## 2024-08-15 NOTE — HISTORY OF PRESENT ILLNESS
[de-identified] : Ms. GABY BOYKIN is a 73-year-old female referred Dr. Menendez who presented with gastric cancer s/p robotic assisted distal gastrectomy w/ esophagoduodenostomy 5/18/2023 here for a follow up visit.  Endoscopy 3/29/23: Stomach biopsy: Intramucosal adenocarcinoma. Polypectomy: Intramucosal adenocarcinoma.  EUS 4/26/23: 1 cm raised mid body anterior wall of stomach and 1.2 cm gastric polypoid lesion in the posterior wall. Both location tattooed. T1   Gaby denies abdominal pain, bloating, nausea/vomiting, bowel habit changes, hematochezia, black sticky stools, fever, chills, night sweats or weight loss.   CT A/P 5/9/2023- Enlarged mesenteric lymph nodes measuring up to 2.2 x 1.5 cm with haziness of the adjacent fat of uncertain etiology; differential includes neoplasm including lymphoma, metastatic disease from the patient's known gastric cancer or from a different primary tumor; further evaluation is recommended with either tissue sampling or PET/CT.  PET/CT 5/16/2023 1. No definite abnormal focal uptake in the stomach. 2. Known enlarged mesenteric lymph nodes do not appear to exhibit any abnormal FDG uptake. Further evaluation with a DOTATATE PET scan or tissue sampling may be of additional value as clinically indicated. 3. Additional small subcentimeter mesenteric lymph nodes that appear to exhibit mild FDG uptake versus less likely misregistration from adjacent bowel activity are of uncertain clinical significance and may be inflammatory or neoplastic in nature. Further correlation as clinically indicated.  **SURGERY** s/p robotic assisted distal gastrectomy w/ esophagoduodenostomy 5/18/2023 Path: intramucosal adenocarcinoma in a background of low- & high-grade dysplasia, 1 cm, invades lamina propria, no LVI or PNI, margins negative, 0/19 LN involved, pT1a N0 * mesenteric LN with path of follicular lymphoma  6/29/2023:  <2, CEA 2.2 (WNL)  8/31/2023: Patient started RT with Dr. Mcnamara.   9/20/2023: Currently not on chemo. Scheduled to complete RT mid October. She is complaining of decreased appetite. Denies abdominal pain, nausea, vomiting, fevers/chills.   9/21/2023 OTV. 15/15 fractions of radiation for intra-abd / gastric lymphoma completed.   10/29/2023 CT C/A/P (LHR): S/P partial gastrectomy and gastrojejunostomy without evidence for recurrent or metastatic disease.  B/L tiny benign-appearing pulmonary nodules. No suspicious nodule or mass--> f/u CT chest in 1 year is optional according to Fleischner guidelines.  12/27/2023: Today patient is doing well. Tolerating diet and having bowel function. Denies abdominal pain, nausea, vomiting, fevers/chills.   9/21/23S/p Radiation completed   8/12/24 PET scan- NO focal abnormal activity in the collapsed proximal stomach.  8/14/24 Pt reports feeling well, denies n/v or diarrhea. Daily BMs.    PMH: HTN, HLD, Anxiety  Family History: denies PCP: Dr. Eric Bergeron onc: Dr. Mcnamara Onc: Dr. Mohr

## 2024-08-15 NOTE — HISTORY OF PRESENT ILLNESS
[de-identified] : Ms. GABY BOYKIN is a 73-year-old female referred Dr. Menendez who presented with gastric cancer s/p robotic assisted distal gastrectomy w/ esophagoduodenostomy 5/18/2023 here for a follow up visit.  Endoscopy 3/29/23: Stomach biopsy: Intramucosal adenocarcinoma. Polypectomy: Intramucosal adenocarcinoma.  EUS 4/26/23: 1 cm raised mid body anterior wall of stomach and 1.2 cm gastric polypoid lesion in the posterior wall. Both location tattooed. T1   Gaby denies abdominal pain, bloating, nausea/vomiting, bowel habit changes, hematochezia, black sticky stools, fever, chills, night sweats or weight loss.   CT A/P 5/9/2023- Enlarged mesenteric lymph nodes measuring up to 2.2 x 1.5 cm with haziness of the adjacent fat of uncertain etiology; differential includes neoplasm including lymphoma, metastatic disease from the patient's known gastric cancer or from a different primary tumor; further evaluation is recommended with either tissue sampling or PET/CT.  PET/CT 5/16/2023 1. No definite abnormal focal uptake in the stomach. 2. Known enlarged mesenteric lymph nodes do not appear to exhibit any abnormal FDG uptake. Further evaluation with a DOTATATE PET scan or tissue sampling may be of additional value as clinically indicated. 3. Additional small subcentimeter mesenteric lymph nodes that appear to exhibit mild FDG uptake versus less likely misregistration from adjacent bowel activity are of uncertain clinical significance and may be inflammatory or neoplastic in nature. Further correlation as clinically indicated.  **SURGERY** s/p robotic assisted distal gastrectomy w/ esophagoduodenostomy 5/18/2023 Path: intramucosal adenocarcinoma in a background of low- & high-grade dysplasia, 1 cm, invades lamina propria, no LVI or PNI, margins negative, 0/19 LN involved, pT1a N0 * mesenteric LN with path of follicular lymphoma  6/29/2023:  <2, CEA 2.2 (WNL)  8/31/2023: Patient started RT with Dr. Mcnamara.   9/20/2023: Currently not on chemo. Scheduled to complete RT mid October. She is complaining of decreased appetite. Denies abdominal pain, nausea, vomiting, fevers/chills.   9/21/2023 OTV. 15/15 fractions of radiation for intra-abd / gastric lymphoma completed.   10/29/2023 CT C/A/P (LHR): S/P partial gastrectomy and gastrojejunostomy without evidence for recurrent or metastatic disease.  B/L tiny benign-appearing pulmonary nodules. No suspicious nodule or mass--> f/u CT chest in 1 year is optional according to Fleischner guidelines.  12/27/2023: Today patient is doing well. Tolerating diet and having bowel function. Denies abdominal pain, nausea, vomiting, fevers/chills.   9/21/23S/p Radiation completed   8/12/24 PET scan- NO focal abnormal activity in the collapsed proximal stomach.  8/14/24 Pt reports feeling well, denies n/v or diarrhea. Daily BMs.    PMH: HTN, HLD, Anxiety  Family History: denies PCP: Dr. Eric Bergeron onc: Dr. Mcnamara Onc: Dr. Mohr

## 2024-08-15 NOTE — CONSULT LETTER
[Dear  ___] : Dear  [unfilled], [Courtesy Letter:] : I had the pleasure of seeing your patient, [unfilled], in my office today. [( Thank you for referring [unfilled] for consultation for _____ )] : Thank you for referring [unfilled] for consultation for [unfilled] [Please see my note below.] : Please see my note below. [Consult Closing:] : Thank you very much for allowing me to participate in the care of this patient.  If you have any questions, please do not hesitate to contact me. [Sincerely,] : Sincerely, [FreeTextEntry2] : Dr. Menendez  [FreeTextEntry3] : Oswaldo Matthews MD, FICS, FACS Director of Surgical Oncology- [facility] , Department of Surgery [facility] Medicine at [address] [address]   (mob) [phone] (o) [phone] (f) [phone]  [DrEdie  ___] : Dr. MIXON [DrEdie ___] : Dr. MIXON

## 2024-08-15 NOTE — PHYSICAL EXAM
[Normal] : supple, no neck mass and thyroid not enlarged [Normal Neck Lymph Nodes] : normal neck lymph nodes  [Normal Supraclavicular Lymph Nodes] : normal supraclavicular lymph nodes [Normal Groin Lymph Nodes] : normal groin lymph nodes [Normal Axillary Lymph Nodes] : normal axillary lymph nodes [Normal] : oriented to person, place and time, with appropriate affect [FreeTextEntry1] : COVID-19 precautions as per [facility] policy was universally followed\par   [de-identified] : Abdomen soft, non-tender, non-distended, and no palpable masses.

## 2024-08-15 NOTE — PHYSICAL EXAM
[Normal] : supple, no neck mass and thyroid not enlarged [Normal Neck Lymph Nodes] : normal neck lymph nodes  [Normal Supraclavicular Lymph Nodes] : normal supraclavicular lymph nodes [Normal Groin Lymph Nodes] : normal groin lymph nodes [Normal Axillary Lymph Nodes] : normal axillary lymph nodes [Normal] : oriented to person, place and time, with appropriate affect [FreeTextEntry1] : COVID-19 precautions as per [facility] policy was universally followed\par   [de-identified] : Abdomen soft, non-tender, non-distended, and no palpable masses.

## 2024-08-15 NOTE — HISTORY OF PRESENT ILLNESS
[de-identified] : Ms. GABY BOYKIN is a 73-year-old female referred Dr. Menendez who presented with gastric cancer s/p robotic assisted distal gastrectomy w/ esophagoduodenostomy 5/18/2023 here for a follow up visit.  Endoscopy 3/29/23: Stomach biopsy: Intramucosal adenocarcinoma. Polypectomy: Intramucosal adenocarcinoma.  EUS 4/26/23: 1 cm raised mid body anterior wall of stomach and 1.2 cm gastric polypoid lesion in the posterior wall. Both location tattooed. T1   Gaby denies abdominal pain, bloating, nausea/vomiting, bowel habit changes, hematochezia, black sticky stools, fever, chills, night sweats or weight loss.   CT A/P 5/9/2023- Enlarged mesenteric lymph nodes measuring up to 2.2 x 1.5 cm with haziness of the adjacent fat of uncertain etiology; differential includes neoplasm including lymphoma, metastatic disease from the patient's known gastric cancer or from a different primary tumor; further evaluation is recommended with either tissue sampling or PET/CT.  PET/CT 5/16/2023 1. No definite abnormal focal uptake in the stomach. 2. Known enlarged mesenteric lymph nodes do not appear to exhibit any abnormal FDG uptake. Further evaluation with a DOTATATE PET scan or tissue sampling may be of additional value as clinically indicated. 3. Additional small subcentimeter mesenteric lymph nodes that appear to exhibit mild FDG uptake versus less likely misregistration from adjacent bowel activity are of uncertain clinical significance and may be inflammatory or neoplastic in nature. Further correlation as clinically indicated.  **SURGERY** s/p robotic assisted distal gastrectomy w/ esophagoduodenostomy 5/18/2023 Path: intramucosal adenocarcinoma in a background of low- & high-grade dysplasia, 1 cm, invades lamina propria, no LVI or PNI, margins negative, 0/19 LN involved, pT1a N0 * mesenteric LN with path of follicular lymphoma  6/29/2023:  <2, CEA 2.2 (WNL)  8/31/2023: Patient started RT with Dr. Mcnamara.   9/20/2023: Currently not on chemo. Scheduled to complete RT mid October. She is complaining of decreased appetite. Denies abdominal pain, nausea, vomiting, fevers/chills.   9/21/2023 OTV. 15/15 fractions of radiation for intra-abd / gastric lymphoma completed.   10/29/2023 CT C/A/P (LHR): S/P partial gastrectomy and gastrojejunostomy without evidence for recurrent or metastatic disease.  B/L tiny benign-appearing pulmonary nodules. No suspicious nodule or mass--> f/u CT chest in 1 year is optional according to Fleischner guidelines.  12/27/2023: Today patient is doing well. Tolerating diet and having bowel function. Denies abdominal pain, nausea, vomiting, fevers/chills.   9/21/23S/p Radiation completed   8/12/24 PET scan- NO focal abnormal activity in the collapsed proximal stomach.  8/14/24 Pt reports feeling well, denies n/v or diarrhea. Daily BMs.    PMH: HTN, HLD, Anxiety  Family History: denies PCP: Dr. Eric Bergeron onc: Dr. Mcnamara Onc: Dr. Mohr

## 2024-08-15 NOTE — ASSESSMENT
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  Path: intramucosal adenocarcinoma in a background of low & high grade dysplasia, 1 cm, invades lamina propria, no LVI or PNI, margins negative, 0/19 LN involved, pT1a N0 * mesenteric LN with path of follicular lymphoma  8/31/2023: Patient started RT for the follicular lymphoma with Dr. Naima Centeno. Scheduled to complete RT mid October.  9/21/2023 OTV. 15/15 fractions of radiation for intra-abd / gastric lymphoma completed.   10/29/2023 CT C/A/P (LHR): S/P partial gastrectomy and gastrojejunostomy without evidence for recurrent or metastatic disease. B/L tiny benign-appearing pulmonary nodules. No suspicious nodule or mass--> f/u CT chest in 1 year is optional according to Fleischner guidelines.  8/12/24 8/12/24 PET scan- NO focal abnormal activity in the collapsed proximal stomach.  PLAN: - Stable - RTO prn  I have discussed the diagnosis, therapeutic plan and options with the patient at length. Patient expressed verbal understanding to proceed with proposed plan. All questions answered.

## 2024-08-15 NOTE — CONSULT LETTER
[Dear  ___] : Dear  [unfilled], [Courtesy Letter:] : I had the pleasure of seeing your patient, [unfilled], in my office today. [( Thank you for referring [unfilled] for consultation for _____ )] : Thank you for referring [unfilled] for consultation for [unfilled] [Please see my note below.] : Please see my note below. [Consult Closing:] : Thank you very much for allowing me to participate in the care of this patient.  If you have any questions, please do not hesitate to contact me. [Sincerely,] : Sincerely, [FreeTextEntry2] : Dr. Menendez  [FreeTextEntry3] : Oswaldo Matthews MD, FICS, FACS Director of Surgical Oncology- [facility] , Department of Surgery [facility] Medicine at [address] [address]   (mob) [phone] (o) [phone] (f) [phone]  [DrEdie  ___] : Dr. MIXON [DrEdie ___] : Dr. MIXON

## 2024-11-04 NOTE — DISCHARGE NOTE NURSING/CASE MANAGEMENT/SOCIAL WORK - NSDCPETBCESMAN_GEN_ALL_CORE
Thanks for participating in a office visit with the [facility] today.    Reviewed results of echocardiogram showing aortic valve is functioning well. There is elevation in the artery that feeds to the lungs call pulmonary hypertension.   Follow up echo in 1 year   You are up ~ 50 lbs over the last year.   Recommend low dose lasix 20 mg daily.   Continue all other medication  Follow up BMP in 2 weeks.   My nurse will call in 2 weeks for update in symptoms.      Follow up in 1 year with JULIANA     Please call my nurse at   [phone]. Call with any questions or concerns.    Scheduling phone number: [phone]  Reminder: Please bring in all current medications, over the counter supplements and vitamin bottles to your next appointment.       
If you are a smoker, it is important for your health to stop smoking. Please be aware that second hand smoke is also harmful.

## 2024-12-30 ENCOUNTER — APPOINTMENT (OUTPATIENT)
Dept: NEUROLOGY | Facility: CLINIC | Age: 74
End: 2024-12-30

## 2025-07-01 NOTE — H&P PST ADULT - NSANTHTOTALSCORECAL_ENT_A_CORE
Received a call this afternoon from dtr Angie.  Angie explained that Tere is wishing to contest the application for guardianship and Angie explained that she does not have the resources to fight that.  She explained that Tere is meeting with an attny and she informed Angie that she could also attend.  I recommended she do this to gain full insight so that she can make an informed decision with how she wants to proceed.    I told her similar to what I had told Tere earlier today in a call from her.    I asked her to keep us informed in how they are proceeding so that we will know who to reach out to re: care and concerns.   3

## (undated) DEVICE — GLV 7 PROTEXIS (WHITE)

## (undated) DEVICE — NDL COUNTER FOAM AND MAGNET 40-70

## (undated) DEVICE — SOL IRR POUR NS 0.9% 500ML

## (undated) DEVICE — GLV 8.5 PROTEXIS (WHITE)

## (undated) DEVICE — BLADE SCALPEL SAFETYLOCK #10

## (undated) DEVICE — SUT SILK 3-0 30" SH

## (undated) DEVICE — SUT POLYSORB 3-0 30" V-20 UNDYED

## (undated) DEVICE — D HELP - CLEARVIEW CLEARIFY SYSTEM

## (undated) DEVICE — GLV 6.5 PROTEXIS (WHITE)

## (undated) DEVICE — DRSG AQUACEL 3.5 X 6"

## (undated) DEVICE — SUT SILK 3-0 18" SH (POP-OFF)

## (undated) DEVICE — TROCAR SURGIQUEST AIRSEAL 12MMX100MM

## (undated) DEVICE — POSITIONER PURPLE ARM ONE STEP (LARGE)

## (undated) DEVICE — TUBING IV SET MICROCLAVE ADAPTER

## (undated) DEVICE — GLV 8 PROTEXIS (WHITE)

## (undated) DEVICE — DRAIN RESERVOIR FOR JACKSON PRATT 100CC CARDINAL

## (undated) DEVICE — ENDOCATCH 10MM SPECIMEN POUCH

## (undated) DEVICE — XI DRAPE ARM

## (undated) DEVICE — MARKER ENDO SPOT EX

## (undated) DEVICE — PACK ROBOTIC LIJ

## (undated) DEVICE — WARMING BLANKET UPPER ADULT

## (undated) DEVICE — SUT MONOCRYL 4-0 27" PS-2 UNDYED

## (undated) DEVICE — XI ARM CLIP APPLIER LARGE

## (undated) DEVICE — XI SHEARS HARMONIC CVD 8MM

## (undated) DEVICE — POSITIONER FOAM HEAD CRADLE (PINK)

## (undated) DEVICE — SUT POLYSORB 0 60" TIES UNDYED

## (undated) DEVICE — FOLEY TRAY 16FR 5CC LF LUBRISIL ADVANCE TEMP CLOSED

## (undated) DEVICE — XI ARM FORCEP FENESTRATED BIPOLAR 8MM

## (undated) DEVICE — XI ARM PERMANENT CAUTERY HOOK

## (undated) DEVICE — POSITIONER FOAM EGG CRATE ULNAR 2PCS (PINK)

## (undated) DEVICE — TUBING SUCTION 20FT

## (undated) DEVICE — XI OBTURATOR OPTICAL BLADELESS 8MM

## (undated) DEVICE — PACK BASIN SPECIAL PROCEDURE

## (undated) DEVICE — STAPLER COVIDIEN ENDO GIA STANDARD HANDLE

## (undated) DEVICE — XI DRAPE COLUMN

## (undated) DEVICE — XI ARM DISSECTOR CURVED BIPOLAR 8MM

## (undated) DEVICE — SUT SILK 2-0 18" SH (POP-OFF)

## (undated) DEVICE — WARMING BLANKET LOWER ADULT

## (undated) DEVICE — TROCAR COVIDIEN BLUNT TIP HASSAN 10MM

## (undated) DEVICE — DRAPE IOBAN 23" X 23"

## (undated) DEVICE — DRAIN JACKSON PRATT 10MM FLAT FULL NO TROCAR

## (undated) DEVICE — ENDOCATCH II 15MM

## (undated) DEVICE — NDL INJ SCLERO INTERJECT 23G

## (undated) DEVICE — SUT POLYSORB 0 36" GU-46

## (undated) DEVICE — SUT VLOC 180 3-0 6" CV-23 GREEN

## (undated) DEVICE — XI ARM FORCEP PROGRASP 8MM

## (undated) DEVICE — XI ARM GRASPER TIP UP FENESTRATED

## (undated) DEVICE — TROCAR COVIDIEN VERSASTEP PLUS 15MM STANDARD

## (undated) DEVICE — SUT SILK 2-0 30" SH

## (undated) DEVICE — LIGASURE IMPACT

## (undated) DEVICE — TUBING AIRSEAL TRI-LUMEN FILTERED

## (undated) DEVICE — SUT MAXON 1 96" T-60

## (undated) DEVICE — SOL IRR POUR H2O 250ML

## (undated) DEVICE — XI ARM FORCEP MARYLAND BIPOLAR

## (undated) DEVICE — XI ARM SCISSOR MONO CURVED

## (undated) DEVICE — XI ARM NEEDLE DRIVER SUTURECUT MEGA 8MM

## (undated) DEVICE — GLV 7.5 PROTEXIS (WHITE)

## (undated) DEVICE — DRSG MASTISOL

## (undated) DEVICE — SPECIMEN CONTAINER 100ML

## (undated) DEVICE — VENODYNE/SCD SLEEVE CALF MEDIUM

## (undated) DEVICE — NDL HYPO SAFE 22G X 1.5" (BLACK)

## (undated) DEVICE — ELCTR BOVIE BLADE 3/4" EXTENDED LENGTH 6"

## (undated) DEVICE — XI SEAL UNIV 5- 8 MM

## (undated) DEVICE — BASIN SET SINGLE

## (undated) DEVICE — XI VESSEL SEALER

## (undated) DEVICE — ELCTR BOVIE BLADE .75"

## (undated) DEVICE — APPLICATOR VISTASEAL LAP DUAL 35CM RIGID

## (undated) DEVICE — XI ARM FORCEP CADIERE 8MM

## (undated) DEVICE — DRAIN PENROSE .25" X 18" LATEX

## (undated) DEVICE — XI TIP COVER

## (undated) DEVICE — POSITIONER PINK PAD PIGAZZI SYSTEM